# Patient Record
Sex: MALE | Race: WHITE | NOT HISPANIC OR LATINO | Employment: FULL TIME | URBAN - METROPOLITAN AREA
[De-identification: names, ages, dates, MRNs, and addresses within clinical notes are randomized per-mention and may not be internally consistent; named-entity substitution may affect disease eponyms.]

---

## 2019-07-25 ENCOUNTER — HOSPITAL ENCOUNTER (OUTPATIENT)
Dept: RADIOLOGY | Facility: HOSPITAL | Age: 59
Discharge: HOME | End: 2019-07-25
Payer: COMMERCIAL

## 2019-07-25 ENCOUNTER — TRANSCRIBE ORDERS (OUTPATIENT)
Dept: ADMINISTRATIVE | Facility: HOSPITAL | Age: 59
End: 2019-07-25

## 2019-07-25 DIAGNOSIS — R04.2 COUGHING UP BLOOD: Primary | ICD-10-CM

## 2019-07-25 DIAGNOSIS — R04.2 COUGHING UP BLOOD: ICD-10-CM

## 2019-07-25 PROCEDURE — 71046 X-RAY EXAM CHEST 2 VIEWS: CPT

## 2019-08-01 ENCOUNTER — HOSPITAL ENCOUNTER (OUTPATIENT)
Dept: RADIOLOGY | Facility: HOSPITAL | Age: 59
Discharge: HOME | End: 2019-08-01
Payer: COMMERCIAL

## 2019-08-01 ENCOUNTER — TRANSCRIBE ORDERS (OUTPATIENT)
Dept: ADMINISTRATIVE | Facility: HOSPITAL | Age: 59
End: 2019-08-01

## 2019-08-01 DIAGNOSIS — R04.2 COUGHING UP BLOOD: Primary | ICD-10-CM

## 2019-08-01 PROCEDURE — 71046 X-RAY EXAM CHEST 2 VIEWS: CPT

## 2019-09-01 ENCOUNTER — HOSPITAL ENCOUNTER (EMERGENCY)
Facility: HOSPITAL | Age: 59
Discharge: HOME | End: 2019-09-01
Attending: EMERGENCY MEDICINE

## 2019-09-01 VITALS
RESPIRATION RATE: 18 BRPM | HEART RATE: 104 BPM | DIASTOLIC BLOOD PRESSURE: 94 MMHG | OXYGEN SATURATION: 97 % | TEMPERATURE: 98.3 F | WEIGHT: 235 LBS | SYSTOLIC BLOOD PRESSURE: 181 MMHG

## 2019-09-01 DIAGNOSIS — R60.0 LOWER EXTREMITY EDEMA: Primary | ICD-10-CM

## 2019-09-01 LAB
ALBUMIN SERPL BCP-MCNC: 4 G/DL (ref 3.5–5)
ALP SERPL-CCNC: 112 U/L (ref 46–116)
ALT SERPL W P-5'-P-CCNC: 18 U/L (ref 12–78)
ANION GAP SERPL CALCULATED.3IONS-SCNC: 11 MMOL/L (ref 4–13)
APTT PPP: 29 SECONDS (ref 25–32)
AST SERPL W P-5'-P-CCNC: 16 U/L (ref 5–45)
BASOPHILS # BLD AUTO: 0.02 THOUSANDS/ΜL (ref 0–0.1)
BASOPHILS NFR BLD AUTO: 0 % (ref 0–1)
BILIRUB SERPL-MCNC: 0.8 MG/DL (ref 0.2–1)
BUN SERPL-MCNC: 12 MG/DL (ref 5–25)
CALCIUM SERPL-MCNC: 9.3 MG/DL (ref 8.3–10.1)
CHLORIDE SERPL-SCNC: 104 MMOL/L (ref 100–108)
CO2 SERPL-SCNC: 27 MMOL/L (ref 21–32)
CREAT SERPL-MCNC: 1.34 MG/DL (ref 0.6–1.3)
EOSINOPHIL # BLD AUTO: 0.02 THOUSAND/ΜL (ref 0–0.61)
EOSINOPHIL NFR BLD AUTO: 0 % (ref 0–6)
ERYTHROCYTE [DISTWIDTH] IN BLOOD BY AUTOMATED COUNT: 13.2 % (ref 11.6–15.1)
GFR SERPL CREATININE-BSD FRML MDRD: 58 ML/MIN/1.73SQ M
GLUCOSE SERPL-MCNC: 120 MG/DL (ref 65–140)
HCT VFR BLD AUTO: 46.4 % (ref 36.5–49.3)
HGB BLD-MCNC: 14.8 G/DL (ref 12–17)
IMM GRANULOCYTES # BLD AUTO: 0.03 THOUSAND/UL (ref 0–0.2)
IMM GRANULOCYTES NFR BLD AUTO: 0 % (ref 0–2)
INR PPP: 1 (ref 0.91–1.09)
LYMPHOCYTES # BLD AUTO: 1.07 THOUSANDS/ΜL (ref 0.6–4.47)
LYMPHOCYTES NFR BLD AUTO: 12 % (ref 14–44)
MCH RBC QN AUTO: 29.3 PG (ref 26.8–34.3)
MCHC RBC AUTO-ENTMCNC: 31.9 G/DL (ref 31.4–37.4)
MCV RBC AUTO: 92 FL (ref 82–98)
MONOCYTES # BLD AUTO: 0.72 THOUSAND/ΜL (ref 0.17–1.22)
MONOCYTES NFR BLD AUTO: 8 % (ref 4–12)
NEUTROPHILS # BLD AUTO: 6.98 THOUSANDS/ΜL (ref 1.85–7.62)
NEUTS SEG NFR BLD AUTO: 80 % (ref 43–75)
NRBC BLD AUTO-RTO: 0 /100 WBCS
PLATELET # BLD AUTO: 173 THOUSANDS/UL (ref 149–390)
PMV BLD AUTO: 10 FL (ref 8.9–12.7)
POTASSIUM SERPL-SCNC: 3.6 MMOL/L (ref 3.5–5.3)
PROT SERPL-MCNC: 8.1 G/DL (ref 6.4–8.2)
PROTHROMBIN TIME: 10.7 SECONDS (ref 9.8–12)
RBC # BLD AUTO: 5.05 MILLION/UL (ref 3.88–5.62)
SODIUM SERPL-SCNC: 142 MMOL/L (ref 136–145)
WBC # BLD AUTO: 8.84 THOUSAND/UL (ref 4.31–10.16)

## 2019-09-01 PROCEDURE — 96372 THER/PROPH/DIAG INJ SC/IM: CPT

## 2019-09-01 PROCEDURE — 85730 THROMBOPLASTIN TIME PARTIAL: CPT | Performed by: EMERGENCY MEDICINE

## 2019-09-01 PROCEDURE — 85025 COMPLETE CBC W/AUTO DIFF WBC: CPT | Performed by: EMERGENCY MEDICINE

## 2019-09-01 PROCEDURE — 36415 COLL VENOUS BLD VENIPUNCTURE: CPT | Performed by: EMERGENCY MEDICINE

## 2019-09-01 PROCEDURE — 80053 COMPREHEN METABOLIC PANEL: CPT | Performed by: EMERGENCY MEDICINE

## 2019-09-01 PROCEDURE — 85610 PROTHROMBIN TIME: CPT | Performed by: EMERGENCY MEDICINE

## 2019-09-01 PROCEDURE — 99284 EMERGENCY DEPT VISIT MOD MDM: CPT

## 2019-09-01 RX ADMIN — ENOXAPARIN SODIUM 105 MG: 120 INJECTION SUBCUTANEOUS at 13:11

## 2019-09-01 NOTE — ED PROVIDER NOTES
History  Chief Complaint   Patient presents with    Leg Swelling     patient c/o right lower leg pain - sent by PMD to r/o DVT  patient states pain initially began about a week ago in his heel and is now in his calf with swelling  Patient has been in his usual health recently and noted pain in his right heel and Achilles area  There was no injury  He states the pain started radiating up to the posterior calf but was improved with walking  More recently he noted the pain in the heel resolved but now he has pain in the posterior calf associated with swelling  There is no redness and no fever  There is no chest pain or shortness of breath  [facility] was [facility] today and referred to the ED for evaluation  Patient has no prior history of DVT  He has no contraindications to blood thinners          None       History reviewed  No pertinent past medical history  History reviewed  No pertinent surgical history  History reviewed  No pertinent family history  I have reviewed and agree with the history as documented  Social History     Tobacco Use    Smoking status: Never Smoker    Smokeless tobacco: Never Used   Substance Use Topics    Alcohol use: Never     Frequency: Never    Drug use: Never        Review of Systems   Constitutional: Negative for fever  HENT: Negative for congestion and sore throat  Eyes: Negative for visual disturbance  Respiratory: Negative for cough and shortness of breath  Cardiovascular: Positive for leg swelling  Negative for chest pain  Gastrointestinal: Negative for abdominal pain and vomiting  Genitourinary: Negative for dysuria and hematuria  Musculoskeletal: Positive for arthralgias and gait problem  Skin: Negative for color change and rash  Neurological: Negative for numbness and headaches  Hematological: Does not bruise/bleed easily  Psychiatric/Behavioral: Negative for confusion     All other systems reviewed and are negative  Physical Exam  Physical Exam   Constitutional: He is oriented to person, place, and time  He appears well-developed and well-nourished  HENT:   Head: Normocephalic and atraumatic  Mouth/Throat: Oropharynx is clear and moist    Eyes: Conjunctivae are normal    Neck: Normal range of motion  Neck supple  Cardiovascular: Normal rate, regular rhythm, normal heart sounds and intact distal pulses  Pulmonary/Chest: Effort normal    Abdominal: Soft  There is no tenderness  Musculoskeletal: Normal range of motion  He exhibits edema and tenderness  Neurological: He is alert and oriented to person, place, and time  Skin: Skin is warm and dry  No erythema  Psychiatric: He has a normal mood and affect  His behavior is normal    Nursing note and vitals reviewed        Vital Signs  ED Triage Vitals   Temperature Pulse Respirations Blood Pressure SpO2   09/01/19 1142 09/01/19 1137 09/01/19 1137 09/01/19 1137 09/01/19 1137   98 3 °F (36 8 °C) 104 18 (!) 181/94 97 %      Temp Source Heart Rate Source Patient Position - Orthostatic VS BP Location FiO2 (%)   09/01/19 1142 09/01/19 1137 09/01/19 1137 -- --   Tympanic Monitor Lying        Pain Score       09/01/19 1137       1           Vitals:    09/01/19 1137   BP: (!) 181/94   Pulse: 104   Patient Position - Orthostatic VS: Lying         Visual Acuity      ED Medications  Medications   enoxaparin (LOVENOX) subcutaneous injection 105 mg (has no administration in time range)       Diagnostic Studies  Results Reviewed     Procedure Component Value Units Date/Time    Comprehensive metabolic panel [[id number]]  (Abnormal) Collected:  09/01/19 1204    Lab Status:  Final result Specimen:  Blood from Arm, Left Updated:  09/01/19 1227     Sodium 142 mmol/L      Potassium 3 6 mmol/L      Chloride 104 mmol/L      CO2 27 mmol/L      ANION GAP 11 mmol/L      BUN 12 mg/dL      Creatinine 1 34 mg/dL      Glucose 120 mg/dL      Calcium 9 3 mg/dL      AST 16 U/L      ALT 18 U/L      Alkaline Phosphatase 112 U/L      Total Protein 8 1 g/dL      Albumin 4 0 g/dL      Total Bilirubin 0 80 mg/dL      eGFR 58 ml/min/1 73sq m     Narrative:       [place] guidelines for Chronic Kidney Disease (CKD):     Stage 1 with normal or high GFR (GFR > 90 mL/min/1 73 square meters)    Stage 2 Mild CKD (GFR = 60-89 mL/min/1 73 square meters)    Stage 3A Moderate CKD (GFR = 45-59 mL/min/1 73 square meters)    Stage 3B Moderate CKD (GFR = 30-44 mL/min/1 73 square meters)    Stage 4 Severe CKD (GFR = 15-29 mL/min/1 73 square meters)    Stage 5 End Stage CKD (GFR <15 mL/min/1 73 square meters)  Note: GFR calculation is accurate only with a steady state creatinine    Protime-INR [[id number]]  (Normal) Collected:  09/01/19 1204    Lab Status:  Final result Specimen:  Blood from Arm, Left Updated:  09/01/19 1224     Protime 10 7 seconds      INR 1 00    APTT [[id number]]  (Normal) Collected:  09/01/19 1204    Lab Status:  Final result Specimen:  Blood from Arm, Left Updated:  09/01/19 1224     PTT 29 seconds     CBC and differential [[id number]]  (Abnormal) Collected:  09/01/19 1204    Lab Status:  Final result Specimen:  Blood from Arm, Left Updated:  09/01/19 1210     WBC 8 84 Thousand/uL      RBC 5 05 Million/uL      Hemoglobin 14 8 g/dL      Hematocrit 46 4 %      MCV 92 fL      MCH 29 3 pg      MCHC 31 9 g/dL      RDW 13 2 %      MPV 10 0 fL      Platelets 007 Thousands/uL      nRBC 0 /100 WBCs      Neutrophils Relative 80 %      Immat GRANS % 0 %      Lymphocytes Relative 12 %      Monocytes Relative 8 %      Eosinophils Relative 0 %      Basophils Relative 0 %      Neutrophils Absolute 6 98 Thousands/µL      Immature Grans Absolute 0 03 Thousand/uL      Lymphocytes Absolute 1 07 Thousands/µL      Monocytes Absolute 0 72 Thousand/µL      Eosinophils Absolute 0 02 Thousand/µL      Basophils Absolute 0 02 Thousands/µL                  VAS lower limb venous duplex study, unilateral/limited    (Results Pending)              Procedures  Procedures       ED Course                               MDM  Number of Diagnoses or Management Options  Diagnosis management comments: Patient may have a acute DVT in the right lower extremity  Will check baseline labs including a creatinine  Will schedule outpatient vascular study and cover the patient with Lovenox until it can be arranged      Disposition  Final diagnoses:   Lower extremity edema     Time reflects when diagnosis was documented in both MDM as applicable and the Disposition within this note     Time User Action Codes Description Comment    9/1/2019  1:01 PM Paula Chow Add [R60 0] Lower extremity edema       ED Disposition     ED Disposition Condition Date/Time Comment    Discharge Stable Sun Sep 1, 2019  1:01 PM Ardell Schirmer discharge to home/self care  Follow-up Information     Follow up With Specialties Details Why Contact Info    [facility]  Schedule an appointment as soon as possible for a visit in 1 day  [phone]            Patient's Medications   Discharge Prescriptions    ENOXAPARIN (LOVENOX) 100 MG/ML    Inject 1 mL (100 mg total) under the skin every 12 (twelve) hours for 2 days       Start Date: 9/1/2019  End Date: 9/3/2019       Order Dose: 100 mg       Quantity: 4 mL    Refills: 0     No discharge procedures on file      ED Provider  Electronically Signed by           Rohan Norieag MD  09/01/19 0583

## 2019-09-03 ENCOUNTER — TRANSCRIBE ORDERS (OUTPATIENT)
Dept: ADMINISTRATIVE | Facility: HOSPITAL | Age: 59
End: 2019-09-03

## 2019-09-03 ENCOUNTER — HOSPITAL ENCOUNTER (EMERGENCY)
Facility: HOSPITAL | Age: 59
Discharge: HOME | End: 2019-09-03
Attending: EMERGENCY MEDICINE | Admitting: EMERGENCY MEDICINE

## 2019-09-03 ENCOUNTER — HOSPITAL ENCOUNTER (OUTPATIENT)
Dept: RADIOLOGY | Facility: HOSPITAL | Age: 59
Discharge: HOME | End: 2019-09-03
Attending: EMERGENCY MEDICINE

## 2019-09-03 VITALS
RESPIRATION RATE: 20 BRPM | SYSTOLIC BLOOD PRESSURE: 170 MMHG | HEART RATE: 90 BPM | TEMPERATURE: 98.6 F | DIASTOLIC BLOOD PRESSURE: 82 MMHG | OXYGEN SATURATION: 98 %

## 2019-09-03 DIAGNOSIS — M79.89 LEG SWELLING: ICD-10-CM

## 2019-09-03 DIAGNOSIS — I82.409 DVT (DEEP VENOUS THROMBOSIS) (HCC): Primary | ICD-10-CM

## 2019-09-03 DIAGNOSIS — M79.89 LEG SWELLING: Primary | ICD-10-CM

## 2019-09-03 PROCEDURE — 96372 THER/PROPH/DIAG INJ SC/IM: CPT

## 2019-09-03 PROCEDURE — 99283 EMERGENCY DEPT VISIT LOW MDM: CPT

## 2019-09-03 PROCEDURE — 93971 EXTREMITY STUDY: CPT

## 2019-09-03 PROCEDURE — 93971 EXTREMITY STUDY: CPT | Performed by: SURGERY

## 2019-09-03 RX ORDER — WARFARIN SODIUM 5 MG/1
10 TABLET ORAL
Status: DISCONTINUED | OUTPATIENT
Start: 2019-09-03 | End: 2019-09-03

## 2019-09-03 RX ORDER — WARFARIN SODIUM 5 MG/1
10 TABLET ORAL
Status: COMPLETED | OUTPATIENT
Start: 2019-09-03 | End: 2019-09-03

## 2019-09-03 RX ORDER — WARFARIN SODIUM 5 MG/1
5 TABLET ORAL
Qty: 30 TABLET | Refills: 0 | Status: SHIPPED | OUTPATIENT
Start: 2019-09-03 | End: 2019-09-10

## 2019-09-03 RX ADMIN — ENOXAPARIN SODIUM 100 MG: 100 INJECTION SUBCUTANEOUS at 15:54

## 2019-09-03 RX ADMIN — WARFARIN SODIUM 10 MG: 5 TABLET ORAL at 15:54

## 2019-09-03 NOTE — ED PROVIDER NOTES
History  Chief Complaint   Patient presents with    Evaluation of Abnormal Diagnostic Test     here a couple of days ago with leg swelling, had vascular test today , positive for dvt, on thinners     Patient was seen by me in this emergency room and treated for presumed DVT of the right lower extremity  The patient had a outpatient scheduled vascular study today which was positive for DVT at the popliteal   Patient had no chest pain shortness of breath cough or hemoptysis recently  He is sent to the  ED from vascular department          Prior to Admission Medications   Prescriptions Last Dose Informant Patient Reported? Taking?   enoxaparin (LOVENOX) 100 mg/mL 9/3/2019 at Unknown time  No Yes   Sig: Inject 1 mL (100 mg total) under the skin every 12 (twelve) hours for 2 days      Facility-Administered Medications: None       History reviewed  No pertinent past medical history  History reviewed  No pertinent surgical history  History reviewed  No pertinent family history  I have reviewed and agree with the history as documented  Social History     Tobacco Use    Smoking status: Never Smoker    Smokeless tobacco: Never Used   Substance Use Topics    Alcohol use: Never     Frequency: Never    Drug use: Never        Review of Systems   Constitutional: Negative for chills and fever  HENT: Negative for sore throat  Respiratory: Negative for shortness of breath  Cardiovascular: Positive for leg swelling  Negative for chest pain  Gastrointestinal: Negative for abdominal pain  Genitourinary: Negative for hematuria  Musculoskeletal: Positive for joint swelling  Negative for arthralgias  Skin: Negative for rash  Hematological: Does not bruise/bleed easily  All other systems reviewed and are negative  Physical Exam  Physical Exam   Constitutional: He is oriented to person, place, and time  He appears well-developed and well-nourished  HENT:   Head: Normocephalic and atraumatic  Mouth/Throat: Oropharynx is clear and moist    Eyes: Conjunctivae are normal    Neck: Normal range of motion  Neck supple  Cardiovascular: Normal rate and regular rhythm  Pulmonary/Chest: Effort normal    Abdominal: Soft  Bowel sounds are normal  There is no tenderness  Musculoskeletal: Normal range of motion  He exhibits edema  Neurological: He is alert and oriented to person, place, and time  Skin: Skin is warm and dry  Capillary refill takes less than 2 seconds  Psychiatric: He has a normal mood and affect  His behavior is normal    Nursing note and vitals reviewed  Vital Signs  ED Triage Vitals [09/03/19 1454]   Temperature Pulse Respirations Blood Pressure SpO2   98 6 °F (37 °C) 90 20 170/82 98 %      Temp Source Heart Rate Source Patient Position - Orthostatic VS BP Location FiO2 (%)   Tympanic Monitor Sitting Right arm --      Pain Score       No Pain           Vitals:    09/03/19 1454   BP: 170/82   Pulse: 90   Patient Position - Orthostatic VS: Sitting         Visual Acuity      ED Medications  Medications   enoxaparin (LOVENOX) subcutaneous injection 100 mg (has no administration in time range)   warfarin (COUMADIN) tablet 10 mg (has no administration in time range)       Diagnostic Studies  Results Reviewed     None                 No orders to display              Procedures  Procedures       ED Course                               MDM  Number of Diagnoses or Management Options  Diagnosis management comments: Patient needs a new physician in this area for follow-up  Will start him on Coumadin for insurance purposes        Disposition  Final diagnoses:   DVT (deep venous thrombosis) ([facility] [address] )     Time reflects when diagnosis was documented in both MDM as applicable and the Disposition within this note     Time User Action Codes Description Comment    9/3/2019  3:37 PM Yara Diego Add [I82 409] DVT (deep venous thrombosis) [facility])       ED Disposition     ED Disposition Condition Date/Time Comment    Discharge Stable Tue Sep 3, 2019  3:37 PM Lawrence Scanlon discharge to home/self care  Follow-up Information     Follow up With Specialties Details Why Contact Info    [facility]  Schedule an appointment as soon as possible for a visit   [facility] Schedule an appointment as soon as possible for a visit   [address]  [phone]            Patient's Medications   Discharge Prescriptions    WARFARIN (COUMADIN) 5 MG TABLET    Take 1 tablet (5 mg total) by mouth once for 1 dose       Start Date: 9/3/2019  End Date: 9/3/2019       Order Dose: 5 mg       Quantity: 30 tablet    Refills: 0     No discharge procedures on file      ED Provider  Electronically Signed by           Narcisa Schultz MD  09/03/19 6267

## 2019-09-10 ENCOUNTER — HOSPITAL ENCOUNTER (EMERGENCY)
Facility: HOSPITAL | Age: 59
Discharge: HOME | End: 2019-09-10
Attending: EMERGENCY MEDICINE

## 2019-09-10 VITALS
DIASTOLIC BLOOD PRESSURE: 81 MMHG | RESPIRATION RATE: 18 BRPM | TEMPERATURE: 97 F | OXYGEN SATURATION: 97 % | WEIGHT: 239 LBS | SYSTOLIC BLOOD PRESSURE: 155 MMHG | HEART RATE: 76 BPM

## 2019-09-10 DIAGNOSIS — S61.012A LACERATION OF LEFT THUMB WITHOUT FOREIGN BODY WITHOUT DAMAGE TO NAIL, INITIAL ENCOUNTER: Primary | ICD-10-CM

## 2019-09-10 PROCEDURE — 99282 EMERGENCY DEPT VISIT SF MDM: CPT

## 2019-09-10 PROCEDURE — 90715 TDAP VACCINE 7 YRS/> IM: CPT | Performed by: EMERGENCY MEDICINE

## 2019-09-10 PROCEDURE — 90471 IMMUNIZATION ADMIN: CPT

## 2019-09-10 RX ORDER — LIDOCAINE HYDROCHLORIDE 10 MG/ML
5 INJECTION, SOLUTION EPIDURAL; INFILTRATION; INTRACAUDAL; PERINEURAL ONCE
Status: COMPLETED | OUTPATIENT
Start: 2019-09-10 | End: 2019-09-10

## 2019-09-10 RX ADMIN — TETANUS TOXOID, REDUCED DIPHTHERIA TOXOID AND ACELLULAR PERTUSSIS VACCINE, ADSORBED 0.5 ML: 5; 2.5; 8; 8; 2.5 SUSPENSION INTRAMUSCULAR at 08:08

## 2019-09-10 RX ADMIN — LIDOCAINE HYDROCHLORIDE 5 ML: 10 INJECTION, SOLUTION EPIDURAL; INFILTRATION; INTRACAUDAL; PERINEURAL at 08:08

## 2019-09-10 NOTE — ED PROCEDURE NOTE
PROCEDURE  Laceration repair  Date/Time: 9/10/2019 8:52 AM  Performed by: Clint Cook DO  Authorized by: Clint Cook DO   Consent: Verbal consent obtained  Consent given by: patient  Body area: upper extremity  Location details: left thumb  Laceration length: 2 5 cm  Anesthesia: digital block    Anesthesia:  Local Anesthetic: lidocaine 1% without epinephrine      Procedure Details:  Preparation: Patient was prepped and draped in the usual sterile fashion    Irrigation solution: tap water  Irrigation method: tap  Amount of cleaning: standard  Skin closure: 5-0 nylon and Steri-Strips  Number of sutures: 4  Approximation: close  Approximation difficulty: simple  Patient tolerance: Patient tolerated the procedure well with no immediate complications           Clint Cook DO  09/10/19 5842

## 2019-09-10 NOTE — DISCHARGE INSTRUCTIONS
Care For Your Absorbable Stitches   WHAT YOU NEED TO KNOW:   Absorbable stitches, or sutures, are used to close cuts or wounds  These stitches are absorbed by your body, or fall off on their own within days or weeks  They do not need to be removed  DISCHARGE INSTRUCTIONS:   Seek care immediately if:   · Your wound comes apart  · You have red streaks around your wound  · You have swollen or painful lymph nodes  · Blood soaks through your bandage  Contact your healthcare provider if:   · You have signs of an infection, such as increased redness, pain, swelling, or pus  · You have a fever  · Your stitches do not absorb when your healthcare provider says they should  · You have questions or concerns about your condition or care  Care for your wound and absorbable stitches as directed:   · Protect your wound from further injury  Wear protective clothing over your wound, and protect it from sunlight  Do not place pressure on your wound  This could reopen your wound and increase your risk for an infection  · Elevate your wound  Keep your wound above the level of your heart as often as you can  This will help decrease swelling and pain  Prop your wounded area on pillows or blankets, if possible, to keep it elevated comfortably  · Wash and bandage your wound  Carefully wash the wound with soap and water  Gently dry the area and put on a new, clean bandages as directed  Change your bandages when they get wet or dirty  · Take showers instead of baths  Wait 12 to 24 hours after you receive your stitches before you take a shower  Do not take a bath or swim  Follow up with your healthcare provider as directed:  Write down your questions so you remember to ask them during your visits  © 2017 [address] [employer] Information is for End User's use only and may not be sold, redistributed or otherwise used for commercial purposes   All illustrations and images included in [facility] are the copyrighted property of [employer]  or [employer]  The above information is an  only  It is not intended as medical advice for individual conditions or treatments  Talk to your doctor, nurse or pharmacist before following any medical regimen to see if it is safe and effective for you

## 2019-09-10 NOTE — ED PROVIDER NOTES
History  Chief Complaint   Patient presents with    Laceration     L thumb laceation this am - bleeding controlled with pressure      Patient presents for evaluation of laceration to left thumb  Accidentally cut with knife  Unsure of last tetanus  History provided by:  Patient   used: No    Laceration       Prior to Admission Medications   Prescriptions Last Dose Informant Patient Reported? Taking? apixaban (ELIQUIS) 5 mg   Yes Yes   Sig: Take 20 mg by mouth 2 (two) times a day      Facility-Administered Medications: None       Past Medical History:   Diagnosis Date    DVT of lower limb, acute ([facility] [address] )        History reviewed  No pertinent surgical history  History reviewed  No pertinent family history  I have reviewed and agree with the history as documented  Social History     Tobacco Use    Smoking status: Never Smoker    Smokeless tobacco: Never Used   Substance Use Topics    Alcohol use: Never     Frequency: Never    Drug use: Never        Review of Systems   Skin: Positive for wound  All other systems reviewed and are negative  Physical Exam  Physical Exam   Constitutional: He is oriented to person, place, and time  No distress  Cardiovascular: Normal rate, regular rhythm and intact distal pulses  Pulmonary/Chest: Effort normal and breath sounds normal  No respiratory distress  Musculoskeletal: Normal range of motion  Neurological: He is alert and oriented to person, place, and time  Skin: Capillary refill takes less than 2 seconds  He is not diaphoretic  Nursing note and vitals reviewed        Vital Signs  ED Triage Vitals [09/10/19 0741]   Temperature Pulse Respirations Blood Pressure SpO2   (!) 97 °F (36 1 °C) 86 18 (!) 181/81 98 %      Temp Source Heart Rate Source Patient Position - Orthostatic VS BP Location FiO2 (%)   Tympanic Monitor Sitting Right arm --      Pain Score       1           Vitals:    09/10/19 0741   BP: (!) 181/81   Pulse: 86 Patient Position - Orthostatic VS: Sitting         Visual Acuity      ED Medications  Medications   lidocaine (PF) (XYLOCAINE-MPF) 1 % injection 5 mL (has no administration in time range)       Diagnostic Studies  Results Reviewed     None                 No orders to display              Procedures  Procedures       ED Course                               MDM  Number of Diagnoses or Management Options  Laceration of left thumb without foreign body without damage to nail, initial encounter:   Diagnosis management comments: Pulse ox 98% on RA indicating adequate oxygenation       Amount and/or Complexity of Data Reviewed  Decide to obtain previous medical records or to obtain history from someone other than the patient: yes  Review and summarize past medical records: yes    Patient Progress  Patient progress: stable      Disposition  Final diagnoses:   None     ED Disposition     None      Follow-up Information    None         Patient's Medications   Discharge Prescriptions    No medications on file     No discharge procedures on file      ED Provider  Electronically Signed by           Carl Dickson DO  09/10/19 5955

## 2019-10-04 ENCOUNTER — TRANSCRIBE ORDERS (OUTPATIENT)
Dept: ADMINISTRATIVE | Facility: HOSPITAL | Age: 59
End: 2019-10-04

## 2019-10-04 DIAGNOSIS — I82.403 ACUTE EMBOLISM AND THROMBOSIS OF UNSPECIFIED DEEP VEINS OF LOWER EXTREMITY, BILATERAL (HCC): Primary | ICD-10-CM

## 2019-10-10 ENCOUNTER — HOSPITAL ENCOUNTER (OUTPATIENT)
Dept: RADIOLOGY | Facility: HOSPITAL | Age: 59
Discharge: HOME | End: 2019-10-10
Payer: COMMERCIAL

## 2019-10-10 DIAGNOSIS — I82.403 ACUTE EMBOLISM AND THROMBOSIS OF UNSPECIFIED DEEP VEINS OF LOWER EXTREMITY, BILATERAL (HCC): ICD-10-CM

## 2019-10-10 PROCEDURE — 93970 EXTREMITY STUDY: CPT

## 2019-10-10 PROCEDURE — 93970 EXTREMITY STUDY: CPT | Performed by: SURGERY

## 2020-01-31 ENCOUNTER — TRANSCRIBE ORDERS (OUTPATIENT)
Dept: ADMINISTRATIVE | Facility: HOSPITAL | Age: 60
End: 2020-01-31

## 2020-01-31 DIAGNOSIS — I82.403: Primary | ICD-10-CM

## 2020-01-31 DIAGNOSIS — E55.9 VITAMIN D DEFICIENCY, UNSPECIFIED: ICD-10-CM

## 2020-01-31 DIAGNOSIS — R53.83 OTHER FATIGUE: ICD-10-CM

## 2020-01-31 DIAGNOSIS — D64.9 ANEMIA, UNSPECIFIED TYPE: ICD-10-CM

## 2020-01-31 DIAGNOSIS — N42.9 DISORDER OF PROSTATE, UNSPECIFIED: ICD-10-CM

## 2020-01-31 DIAGNOSIS — D51.9 ANEMIA DUE TO VITAMIN B12 DEFICIENCY, UNSPECIFIED B12 DEFICIENCY TYPE: ICD-10-CM

## 2020-02-07 ENCOUNTER — HOSPITAL ENCOUNTER (OUTPATIENT)
Dept: RADIOLOGY | Facility: HOSPITAL | Age: 60
Discharge: HOME | End: 2020-02-07
Payer: COMMERCIAL

## 2020-02-07 DIAGNOSIS — D64.9 ANEMIA, UNSPECIFIED TYPE: ICD-10-CM

## 2020-02-07 DIAGNOSIS — N42.9 DISORDER OF PROSTATE, UNSPECIFIED: ICD-10-CM

## 2020-02-07 DIAGNOSIS — I82.403: ICD-10-CM

## 2020-02-07 DIAGNOSIS — R53.83 OTHER FATIGUE: ICD-10-CM

## 2020-02-07 DIAGNOSIS — D51.9 ANEMIA DUE TO VITAMIN B12 DEFICIENCY, UNSPECIFIED B12 DEFICIENCY TYPE: ICD-10-CM

## 2020-02-07 DIAGNOSIS — E55.9 VITAMIN D DEFICIENCY, UNSPECIFIED: ICD-10-CM

## 2020-02-07 PROCEDURE — 76700 US EXAM ABDOM COMPLETE: CPT

## 2020-02-07 PROCEDURE — 93971 EXTREMITY STUDY: CPT | Performed by: SURGERY

## 2020-02-07 PROCEDURE — 93971 EXTREMITY STUDY: CPT

## 2021-11-03 ENCOUNTER — HOSPITAL ENCOUNTER (EMERGENCY)
Facility: HOSPITAL | Age: 61
Discharge: HOME | End: 2021-11-03
Attending: EMERGENCY MEDICINE
Payer: COMMERCIAL

## 2021-11-03 VITALS
OXYGEN SATURATION: 100 % | DIASTOLIC BLOOD PRESSURE: 85 MMHG | WEIGHT: 248 LBS | RESPIRATION RATE: 16 BRPM | TEMPERATURE: 97.5 F | HEART RATE: 80 BPM | SYSTOLIC BLOOD PRESSURE: 186 MMHG

## 2021-11-03 DIAGNOSIS — K42.9 UMBILICAL HERNIA WITHOUT OBSTRUCTION AND WITHOUT GANGRENE: Primary | ICD-10-CM

## 2021-11-03 PROCEDURE — 99282 EMERGENCY DEPT VISIT SF MDM: CPT | Performed by: EMERGENCY MEDICINE

## 2021-11-03 PROCEDURE — 99283 EMERGENCY DEPT VISIT LOW MDM: CPT

## 2021-11-03 RX ORDER — ASPIRIN 81 MG/1
81 TABLET, CHEWABLE ORAL DAILY
COMMUNITY

## 2021-11-15 ENCOUNTER — CONSULT (OUTPATIENT)
Dept: SURGERY | Facility: CLINIC | Age: 61
End: 2021-11-15
Payer: COMMERCIAL

## 2021-11-15 VITALS
BODY MASS INDEX: 33.83 KG/M2 | DIASTOLIC BLOOD PRESSURE: 86 MMHG | SYSTOLIC BLOOD PRESSURE: 172 MMHG | TEMPERATURE: 98.4 F | WEIGHT: 249.8 LBS | HEART RATE: 87 BPM | HEIGHT: 72 IN

## 2021-11-15 DIAGNOSIS — E66.9 OBESITY (BMI 30-39.9): ICD-10-CM

## 2021-11-15 DIAGNOSIS — K42.9 UMBILICAL HERNIA WITHOUT OBSTRUCTION AND WITHOUT GANGRENE: Primary | ICD-10-CM

## 2021-11-15 DIAGNOSIS — Z12.11 COLON CANCER SCREENING: ICD-10-CM

## 2021-11-15 PROCEDURE — 99203 OFFICE O/P NEW LOW 30 MIN: CPT | Performed by: SURGERY

## 2022-01-28 ENCOUNTER — HOSPITAL ENCOUNTER (EMERGENCY)
Facility: HOSPITAL | Age: 62
Discharge: HOME | End: 2022-01-28
Attending: EMERGENCY MEDICINE
Payer: COMMERCIAL

## 2022-01-28 VITALS
OXYGEN SATURATION: 98 % | DIASTOLIC BLOOD PRESSURE: 81 MMHG | RESPIRATION RATE: 16 BRPM | TEMPERATURE: 98.7 F | HEART RATE: 103 BPM | SYSTOLIC BLOOD PRESSURE: 189 MMHG

## 2022-01-28 DIAGNOSIS — L50.9 URTICARIA: Primary | ICD-10-CM

## 2022-01-28 PROCEDURE — 99282 EMERGENCY DEPT VISIT SF MDM: CPT

## 2022-01-28 PROCEDURE — 99284 EMERGENCY DEPT VISIT MOD MDM: CPT | Performed by: EMERGENCY MEDICINE

## 2022-01-28 RX ORDER — HYDROXYZINE HYDROCHLORIDE 25 MG/1
50 TABLET, FILM COATED ORAL EVERY 6 HOURS PRN
Qty: 12 TABLET | Refills: 0 | Status: SHIPPED | OUTPATIENT
Start: 2022-01-28 | End: 2022-01-28 | Stop reason: SDUPTHER

## 2022-01-28 RX ORDER — PREDNISONE 10 MG/1
TABLET ORAL
Qty: 30 TABLET | Refills: 0 | Status: SHIPPED | OUTPATIENT
Start: 2022-01-28

## 2022-01-28 RX ORDER — HYDROXYZINE HYDROCHLORIDE 25 MG/1
50 TABLET, FILM COATED ORAL EVERY 6 HOURS PRN
Qty: 30 TABLET | Refills: 0 | Status: SHIPPED | OUTPATIENT
Start: 2022-01-28

## 2022-01-28 RX ORDER — PREDNISONE 20 MG/1
60 TABLET ORAL ONCE
Status: COMPLETED | OUTPATIENT
Start: 2022-01-28 | End: 2022-01-28

## 2022-01-28 RX ORDER — HYDROXYZINE HYDROCHLORIDE 25 MG/1
50 TABLET, FILM COATED ORAL ONCE
Status: COMPLETED | OUTPATIENT
Start: 2022-01-28 | End: 2022-01-28

## 2022-01-28 RX ADMIN — HYDROXYZINE HYDROCHLORIDE 50 MG: 25 TABLET ORAL at 16:57

## 2022-01-28 RX ADMIN — PREDNISONE 60 MG: 20 TABLET ORAL at 16:57

## 2022-01-28 NOTE — ED PROVIDER NOTES
History  Chief Complaint   Patient presents with    Rash     rash to torso and neck for a couple of days  states itchy     63 yo male s/p covid infection 2 weeks ago c/o rash on trunk for the last 3 days  Rash is itchy  He had been taking 4 different new meds for covid, he thinks they were vitamins and supplements  No recent fever, cough, vomiting, diarrhea  No new products or foods  He has a h/o environmental allergies as a child  He did try a couple doses of benadryl yesterday but it didn't help  History provided by:  Patient   used: No        Prior to Admission Medications   Prescriptions Last Dose Informant Patient Reported? Taking? apixaban (ELIQUIS) 5 mg   Yes No   Sig: Take 20 mg by mouth 2 (two) times a day   Patient not taking: Reported on 11/3/2021   aspirin 81 mg chewable tablet 1/27/2022 at Unknown time  Yes Yes   Sig: Chew 81 mg daily      Facility-Administered Medications: None       Past Medical History:   Diagnosis Date    DVT of lower limb, acute (HCC)     Pneumonia     Rheumatic fever        Past Surgical History:   Procedure Laterality Date    TONSILLECTOMY         History reviewed  No pertinent family history  I have reviewed and agree with the history as documented  E-Cigarette/Vaping    E-Cigarette Use Never User      E-Cigarette/Vaping Substances     Social History     Tobacco Use    Smoking status: Never Smoker    Smokeless tobacco: Never Used   Vaping Use    Vaping Use: Never used   Substance Use Topics    Alcohol use: Never    Drug use: Never       Review of Systems   Constitutional: Negative  Negative for chills and fever  HENT: Negative  Negative for congestion and sore throat  Eyes: Negative  Respiratory: Negative  Negative for cough and shortness of breath  Cardiovascular: Negative  Negative for chest pain and leg swelling  Gastrointestinal: Negative  Negative for abdominal pain, diarrhea, nausea and vomiting  Genitourinary: Negative  Negative for dysuria, flank pain and hematuria  Musculoskeletal: Negative  Negative for back pain and myalgias  Skin: Positive for rash  Negative for wound  Neurological: Negative  Negative for dizziness and headaches  Psychiatric/Behavioral: Negative  Negative for confusion and hallucinations  The patient is not nervous/anxious  All other systems reviewed and are negative  Physical Exam  Physical Exam  Vitals and nursing note reviewed  Constitutional:       General: He is not in acute distress  Appearance: He is well-developed  He is not ill-appearing or diaphoretic  HENT:      Head: Normocephalic and atraumatic  Right Ear: External ear normal       Left Ear: External ear normal    Eyes:      General: No scleral icterus  Conjunctiva/sclera: Conjunctivae normal    Cardiovascular:      Rate and Rhythm: Normal rate and regular rhythm  Heart sounds: Normal heart sounds  No murmur heard  Pulmonary:      Effort: Pulmonary effort is normal  No respiratory distress  Breath sounds: Normal breath sounds  Chest:      Chest wall: No tenderness  Musculoskeletal:         General: No tenderness or deformity  Normal range of motion  Cervical back: Normal range of motion and neck supple  Skin:     General: Skin is warm and dry  Coloration: Skin is not pale  Findings: Rash present  No erythema  Comments: Diffuse urticaria all over trunk front and back   Neurological:      General: No focal deficit present  Mental Status: He is alert and oriented to person, place, and time  Motor: No weakness     Psychiatric:         Mood and Affect: Mood normal          Behavior: Behavior normal          Vital Signs  ED Triage Vitals [01/28/22 1632]   Temperature Pulse Respirations Blood Pressure SpO2   98 7 °F (37 1 °C) 103 16 (!) 189/81 98 %      Temp Source Heart Rate Source Patient Position - Orthostatic VS BP Location FiO2 (%) Tympanic Monitor Sitting Right arm --      Pain Score       No Pain           Vitals:    01/28/22 1632   BP: (!) 189/81   Pulse: 103   Patient Position - Orthostatic VS: Sitting         Visual Acuity      ED Medications  Medications   predniSONE tablet 60 mg (60 mg Oral Given 1/28/22 1657)   hydrOXYzine HCL (ATARAX) tablet 50 mg (50 mg Oral Given 1/28/22 1657)       Diagnostic Studies  Results Reviewed     None                 No orders to display              Procedures  Procedures         ED Course                                             MDM  Number of Diagnoses or Management Options  Urticaria  Diagnosis management comments: Will try course of prednisone taper and atarax and advised follow up outpt  If not improving  Disposition  Final diagnoses:   Urticaria     Time reflects when diagnosis was documented in both MDM as applicable and the Disposition within this note     Time User Action Codes Description Comment    4/98/9754  9:90 PM Sahara Fagan [F90 6] Urticaria       ED Disposition     ED Disposition Condition Date/Time Comment    Discharge Stable Fri Jan 28, 2022  4:50 PM Merna Bumps discharge to home/self care  Follow-up Information     Follow up With Specialties Details Why Contact Info    your doctor   As needed           Patient's Medications   Discharge Prescriptions    HYDROXYZINE HCL (ATARAX) 25 MG TABLET    Take 2 tablets (50 mg total) by mouth every 6 (six) hours as needed for itching       Start Date: 1/28/2022 End Date: --       Order Dose: 50 mg       Quantity: 30 tablet    Refills: 0    PREDNISONE 10 MG TABLET    5 po daily x2 days, then 4 po daily x2 days, then 3 po daily x2 days, then 2 po daily x2 days,then 1 po daily x2days       Start Date: 1/28/2022 End Date: --       Order Dose: --       Quantity: 30 tablet    Refills: 0       No discharge procedures on file      PDMP Review     None          ED Provider  Electronically Signed by           nAdrew Diaz MD  01/28/22 9428

## 2022-05-01 ENCOUNTER — HOSPITAL ENCOUNTER (EMERGENCY)
Facility: HOSPITAL | Age: 62
Discharge: HOME | End: 2022-05-02
Attending: EMERGENCY MEDICINE
Payer: COMMERCIAL

## 2022-05-01 ENCOUNTER — APPOINTMENT (EMERGENCY)
Dept: RADIOLOGY | Facility: HOSPITAL | Age: 62
End: 2022-05-01
Payer: COMMERCIAL

## 2022-05-01 DIAGNOSIS — N20.0 KIDNEY STONE: Primary | ICD-10-CM

## 2022-05-01 LAB
ALBUMIN SERPL BCP-MCNC: 4.2 G/DL (ref 3.5–5)
ALP SERPL-CCNC: 105 U/L (ref 46–116)
ALT SERPL W P-5'-P-CCNC: 37 U/L (ref 12–78)
ANION GAP SERPL CALCULATED.3IONS-SCNC: 12 MMOL/L (ref 4–13)
AST SERPL W P-5'-P-CCNC: 30 U/L (ref 5–45)
BACTERIA UR QL AUTO: ABNORMAL /HPF
BASOPHILS # BLD AUTO: 0.05 THOUSANDS/ΜL (ref 0–0.1)
BASOPHILS NFR BLD AUTO: 0 % (ref 0–1)
BILIRUB SERPL-MCNC: 0.76 MG/DL (ref 0.2–1)
BILIRUB UR QL STRIP: NEGATIVE
BUN SERPL-MCNC: 20 MG/DL (ref 5–25)
CALCIUM SERPL-MCNC: 9.1 MG/DL (ref 8.3–10.1)
CHLORIDE SERPL-SCNC: 103 MMOL/L (ref 100–108)
CLARITY UR: CLEAR
CO2 SERPL-SCNC: 25 MMOL/L (ref 21–32)
COLOR UR: YELLOW
CREAT SERPL-MCNC: 1.62 MG/DL (ref 0.6–1.3)
EOSINOPHIL # BLD AUTO: 0 THOUSAND/ΜL (ref 0–0.61)
EOSINOPHIL NFR BLD AUTO: 0 % (ref 0–6)
ERYTHROCYTE [DISTWIDTH] IN BLOOD BY AUTOMATED COUNT: 13 % (ref 11.6–15.1)
GFR SERPL CREATININE-BSD FRML MDRD: 44 ML/MIN/1.73SQ M
GLUCOSE SERPL-MCNC: 127 MG/DL (ref 65–140)
GLUCOSE UR STRIP-MCNC: NEGATIVE MG/DL
HCT VFR BLD AUTO: 46.4 % (ref 36.5–49.3)
HGB BLD-MCNC: 15.7 G/DL (ref 12–17)
HGB UR QL STRIP.AUTO: ABNORMAL
IMM GRANULOCYTES # BLD AUTO: 0.07 THOUSAND/UL (ref 0–0.2)
IMM GRANULOCYTES NFR BLD AUTO: 0 % (ref 0–2)
KETONES UR STRIP-MCNC: ABNORMAL MG/DL
LEUKOCYTE ESTERASE UR QL STRIP: NEGATIVE
LYMPHOCYTES # BLD AUTO: 0.77 THOUSANDS/ΜL (ref 0.6–4.47)
LYMPHOCYTES NFR BLD AUTO: 4 % (ref 14–44)
MCH RBC QN AUTO: 30.7 PG (ref 26.8–34.3)
MCHC RBC AUTO-ENTMCNC: 33.8 G/DL (ref 31.4–37.4)
MCV RBC AUTO: 91 FL (ref 82–98)
MONOCYTES # BLD AUTO: 1.19 THOUSAND/ΜL (ref 0.17–1.22)
MONOCYTES NFR BLD AUTO: 7 % (ref 4–12)
MUCOUS THREADS UR QL AUTO: ABNORMAL
NEUTROPHILS # BLD AUTO: 15.41 THOUSANDS/ΜL (ref 1.85–7.62)
NEUTS SEG NFR BLD AUTO: 89 % (ref 43–75)
NITRITE UR QL STRIP: NEGATIVE
NON-SQ EPI CELLS URNS QL MICRO: ABNORMAL /HPF
NRBC BLD AUTO-RTO: 0 /100 WBCS
PH UR STRIP.AUTO: 5.5 [PH]
PLATELET # BLD AUTO: 284 THOUSANDS/UL (ref 149–390)
PMV BLD AUTO: 10.2 FL (ref 8.9–12.7)
POTASSIUM SERPL-SCNC: 4.4 MMOL/L (ref 3.5–5.3)
PROT SERPL-MCNC: 8.1 G/DL (ref 6.4–8.2)
PROT UR STRIP-MCNC: NEGATIVE MG/DL
RBC # BLD AUTO: 5.12 MILLION/UL (ref 3.88–5.62)
RBC #/AREA URNS AUTO: ABNORMAL /HPF
SODIUM SERPL-SCNC: 140 MMOL/L (ref 136–145)
SP GR UR STRIP.AUTO: >=1.03 (ref 1–1.03)
UROBILINOGEN UR QL STRIP.AUTO: 0.2 E.U./DL
WBC # BLD AUTO: 17.49 THOUSAND/UL (ref 4.31–10.16)
WBC #/AREA URNS AUTO: ABNORMAL /HPF

## 2022-05-01 PROCEDURE — G1004 CDSM NDSC: HCPCS

## 2022-05-01 PROCEDURE — 74176 CT ABD & PELVIS W/O CONTRAST: CPT

## 2022-05-01 PROCEDURE — 80053 COMPREHEN METABOLIC PANEL: CPT

## 2022-05-01 PROCEDURE — 81001 URINALYSIS AUTO W/SCOPE: CPT

## 2022-05-01 PROCEDURE — 99285 EMERGENCY DEPT VISIT HI MDM: CPT | Performed by: EMERGENCY MEDICINE

## 2022-05-01 PROCEDURE — 36415 COLL VENOUS BLD VENIPUNCTURE: CPT

## 2022-05-01 PROCEDURE — 99284 EMERGENCY DEPT VISIT MOD MDM: CPT

## 2022-05-01 PROCEDURE — 96374 THER/PROPH/DIAG INJ IV PUSH: CPT

## 2022-05-01 PROCEDURE — 85025 COMPLETE CBC W/AUTO DIFF WBC: CPT

## 2022-05-01 PROCEDURE — 96361 HYDRATE IV INFUSION ADD-ON: CPT

## 2022-05-01 RX ORDER — HYDROMORPHONE HCL/PF 1 MG/ML
0.5 SYRINGE (ML) INJECTION ONCE
Status: COMPLETED | OUTPATIENT
Start: 2022-05-01 | End: 2022-05-01

## 2022-05-01 RX ORDER — TAMSULOSIN HYDROCHLORIDE 0.4 MG/1
0.4 CAPSULE ORAL ONCE
Status: COMPLETED | OUTPATIENT
Start: 2022-05-01 | End: 2022-05-02

## 2022-05-01 RX ADMIN — HYDROMORPHONE HYDROCHLORIDE 0.5 MG: 1 INJECTION, SOLUTION INTRAMUSCULAR; INTRAVENOUS; SUBCUTANEOUS at 22:46

## 2022-05-01 RX ADMIN — SODIUM CHLORIDE 1000 ML: 0.9 INJECTION, SOLUTION INTRAVENOUS at 22:03

## 2022-05-01 NOTE — Clinical Note
Yesenia Nguyen was seen and treated in our emergency department on 5/1/2022  Diagnosis:     Marbella Chris  may return to work on return date  He may return on this date: 05/03/2022         If you have any questions or concerns, please don't hesitate to call        Sherley Kat MD    ______________________________           _______________          _______________  Hospital Representative                              Date                                Time

## 2022-05-02 VITALS
HEART RATE: 82 BPM | TEMPERATURE: 97.1 F | OXYGEN SATURATION: 99 % | SYSTOLIC BLOOD PRESSURE: 154 MMHG | DIASTOLIC BLOOD PRESSURE: 72 MMHG | RESPIRATION RATE: 18 BRPM

## 2022-05-02 RX ORDER — OXYCODONE HYDROCHLORIDE AND ACETAMINOPHEN 5; 325 MG/1; MG/1
1 TABLET ORAL EVERY 4 HOURS PRN
Qty: 20 TABLET | Refills: 0 | Status: SHIPPED | OUTPATIENT
Start: 2022-05-02 | End: 2022-05-09

## 2022-05-02 RX ORDER — OXYCODONE HYDROCHLORIDE AND ACETAMINOPHEN 5; 325 MG/1; MG/1
2 TABLET ORAL ONCE
Status: COMPLETED | OUTPATIENT
Start: 2022-05-02 | End: 2022-05-02

## 2022-05-02 RX ORDER — TAMSULOSIN HYDROCHLORIDE 0.4 MG/1
0.4 CAPSULE ORAL
Qty: 5 CAPSULE | Refills: 0 | Status: SHIPPED | OUTPATIENT
Start: 2022-05-02

## 2022-05-02 RX ADMIN — TAMSULOSIN HYDROCHLORIDE 0.4 MG: 0.4 CAPSULE ORAL at 00:18

## 2022-05-02 RX ADMIN — OXYCODONE HYDROCHLORIDE AND ACETAMINOPHEN 2 TABLET: 5; 325 TABLET ORAL at 00:59

## 2022-05-02 NOTE — ED NOTES
Patient discharged with urinal and urine strainer  Given information about trying to collect stone  Patient verbalizes understanding        Jesika Velasquez, [facility]  05/02/22 5949

## 2022-05-02 NOTE — ED NOTES
[facility]      Veleta Roots, [place]  05/01/22 8717

## 2022-05-02 NOTE — ED PROVIDER NOTES
History  Chief Complaint   Patient presents with    Flank Pain     L flank pain with some radiation to side which started this afternoon  may have seen some blood in his urine today     Patient states he had some mild discomfort in the left flank yesterday  Today the pain became much worse and was associated with an episode of  hematuria without dysuria or frequency  He had no nausea vomiting  No fever chills  Patient takes aspirin but no other blood thinners  He has a family history of kidney stones but he himself has never had 1  Prior to Admission Medications   Prescriptions Last Dose Informant Patient Reported? Taking? apixaban (ELIQUIS) 5 mg   Yes No   Sig: Take 20 mg by mouth 2 (two) times a day   Patient not taking: Reported on 11/3/2021   aspirin 81 mg chewable tablet   Yes No   Sig: Chew 81 mg daily   hydrOXYzine HCL (ATARAX) 25 mg tablet Not Taking at Unknown time  No No   Sig: Take 2 tablets (50 mg total) by mouth every 6 (six) hours as needed for itching   Patient not taking: Reported on 2022    predniSONE 10 mg tablet Not Taking at Unknown time  No No   Si po daily x2 days, then 4 po daily x2 days, then 3 po daily x2 days, then 2 po daily x2 days,then 1 po daily x2days   Patient not taking: Reported on 2022       Facility-Administered Medications: None       Past Medical History:   Diagnosis Date    DVT of lower limb, acute (HCC)     Pneumonia     Rheumatic fever        Past Surgical History:   Procedure Laterality Date    TONSILLECTOMY         History reviewed  No pertinent family history  I have reviewed and agree with the history as documented      E-Cigarette/Vaping    E-Cigarette Use Never User      E-Cigarette/Vaping Substances     Social History     Tobacco Use    Smoking status: Never Smoker    Smokeless tobacco: Never Used   Vaping Use    Vaping Use: Never used   Substance Use Topics    Alcohol use: Never    Drug use: Never       Review of Systems Constitutional: Negative for chills, diaphoresis and fever  HENT: Negative for congestion and sore throat  Eyes: Negative for visual disturbance  Respiratory: Negative for shortness of breath  Cardiovascular: Negative for chest pain  Gastrointestinal: Positive for abdominal pain  Negative for nausea and vomiting  Genitourinary: Positive for flank pain and hematuria  Negative for dysuria, frequency, genital sores and penile pain  Musculoskeletal: Positive for back pain  Skin: Negative for color change and rash  Neurological: Negative for headaches  Hematological: Does not bruise/bleed easily  Psychiatric/Behavioral: Negative for confusion  All other systems reviewed and are negative  Physical Exam  Physical Exam  Vitals and nursing note reviewed  Constitutional:       Appearance: Normal appearance  HENT:      Head: Normocephalic  Right Ear: External ear normal       Left Ear: External ear normal       Nose: Nose normal       Mouth/Throat:      Pharynx: Oropharynx is clear  Eyes:      Conjunctiva/sclera: Conjunctivae normal    Cardiovascular:      Rate and Rhythm: Normal rate and regular rhythm  Pulses: Normal pulses  Heart sounds: Normal heart sounds  Pulmonary:      Effort: Pulmonary effort is normal    Abdominal:      General: Bowel sounds are normal       Palpations: Abdomen is soft  Tenderness: There is no abdominal tenderness  Musculoskeletal:         General: No tenderness  Normal range of motion  Cervical back: Normal range of motion and neck supple  Comments: No CVA tenderness   Skin:     General: Skin is warm and dry  Capillary Refill: Capillary refill takes less than 2 seconds  Neurological:      General: No focal deficit present  Mental Status: He is alert and oriented to person, place, and time     Psychiatric:         Mood and Affect: Mood normal          Behavior: Behavior normal          Vital Signs  ED Triage Vitals [05/01/22 2006]   Temperature Pulse Respirations Blood Pressure SpO2   (!) 97 1 °F (36 2 °C) 85 (!) 24 (!) 188/88 100 %      Temp Source Heart Rate Source Patient Position - Orthostatic VS BP Location FiO2 (%)   Tympanic Monitor Sitting Right arm --      Pain Score       5           Vitals:    05/01/22 2006 05/01/22 2220 05/01/22 2245   BP: (!) 188/88 158/73    Pulse: 85 84 80   Patient Position - Orthostatic VS: Sitting Sitting          Visual Acuity      ED Medications  Medications   oxyCODONE-acetaminophen (PERCOCET) 5-325 mg per tablet 2 tablet (has no administration in time range)   sodium chloride 0 9 % bolus 1,000 mL (0 mL Intravenous Stopped 5/2/22 0017)   HYDROmorphone (DILAUDID) injection 0 5 mg (0 5 mg Intravenous Given 5/1/22 2246)   tamsulosin (FLOMAX) capsule 0 4 mg (0 4 mg Oral Given 5/2/22 0018)       Diagnostic Studies  Results Reviewed     Procedure Component Value Units Date/Time    Urine Microscopic [[id number]]  (Abnormal) Collected: 05/01/22 2203    Lab Status: Final result Specimen: Urine, Clean Catch Updated: 05/01/22 2222     RBC, UA 30-50 /hpf      WBC, UA 0-1 /hpf      Epithelial Cells Occasional /hpf      Bacteria, UA Occasional /hpf      MUCUS THREADS Occasional    Comprehensive metabolic panel [[id number]]  (Abnormal) Collected: 05/01/22 2156    Lab Status: Final result Specimen: Blood from Arm, Left Updated: 05/01/22 2217     Sodium 140 mmol/L      Potassium 4 4 mmol/L      Chloride 103 mmol/L      CO2 25 mmol/L      ANION GAP 12 mmol/L      BUN 20 mg/dL      Creatinine 1 62 mg/dL      Glucose 127 mg/dL      Calcium 9 1 mg/dL      AST 30 U/L      ALT 37 U/L      Alkaline Phosphatase 105 U/L      Total Protein 8 1 g/dL      Albumin 4 2 g/dL      Total Bilirubin 0 76 mg/dL      eGFR 44 ml/min/1 73sq m     Narrative:      Sammi guidelines for Chronic Kidney Disease (CKD):     Stage 1 with normal or high GFR (GFR > 90 mL/min/1 73 square meters)    Stage 2 Mild CKD (GFR = 60-89 mL/min/1 73 square meters)    Stage 3A Moderate CKD (GFR = 45-59 mL/min/1 73 square meters)    Stage 3B Moderate CKD (GFR = 30-44 mL/min/1 73 square meters)    Stage 4 Severe CKD (GFR = 15-29 mL/min/1 73 square meters)    Stage 5 End Stage CKD (GFR <15 mL/min/1 73 square meters)  Note: GFR calculation is accurate only with a steady state creatinine    UA (URINE) with reflex to Scope [[id number]]  (Abnormal) Collected: 05/01/22 2203    Lab Status: Final result Specimen: Urine, Clean Catch Updated: 05/01/22 2212     Color, UA Yellow     Clarity, UA Clear     Specific Gravity, UA >=1 030     pH, UA 5 5     Leukocytes, UA Negative     Nitrite, UA Negative     Protein, UA Negative mg/dl      Glucose, UA Negative mg/dl      Ketones, UA 15 (1+) mg/dl      Urobilinogen, UA 0 2 E U /dl      Bilirubin, UA Negative     Blood, UA Large    CBC and differential [[id number]]  (Abnormal) Collected: 05/01/22 2156    Lab Status: Final result Specimen: Blood from Arm, Left Updated: 05/01/22 2202     WBC 17 49 Thousand/uL      RBC 5 12 Million/uL      Hemoglobin 15 7 g/dL      Hematocrit 46 4 %      MCV 91 fL      MCH 30 7 pg      MCHC 33 8 g/dL      RDW 13 0 %      MPV 10 2 fL      Platelets 280 Thousands/uL      nRBC 0 /100 WBCs      Neutrophils Relative 89 %      Immat GRANS % 0 %      Lymphocytes Relative 4 %      Monocytes Relative 7 %      Eosinophils Relative 0 %      Basophils Relative 0 %      Neutrophils Absolute 15 41 Thousands/µL      Immature Grans Absolute 0 07 Thousand/uL      Lymphocytes Absolute 0 77 Thousands/µL      Monocytes Absolute 1 19 Thousand/µL      Eosinophils Absolute 0 00 Thousand/µL      Basophils Absolute 0 05 Thousands/µL                  CT renal stone study abdomen pelvis without contrast   Final Result by Leeanna García DO (05/02 0459)      Mild left-sided hydroureteronephrosis with a 2 mm obstructing stone in the distal left ureter        Thickening of the urinary bladder wall which may represent cystitis  Follow-up with urology is recommended  Nonobstructing bilateral punctate intrarenal calculi  Workstation performed: [id number]                    Procedures  Procedures         ED Course                               SBIRT 22yo+      Most Recent Value   SBIRT (25 yo +)    In order to provide better care to our patients, we are screening all of our patients for alcohol and drug use  Would it be okay to ask you these screening questions? No Filed at: 05/01/2022 2221                    [facility]  Number of Diagnoses or Management Options  Diagnosis management comments: Flank pain associated with hematuria in a patient with a family history of kidney stones  Will CT scan for possible stone      Disposition  Final diagnoses:   Kidney stone     Time reflects when diagnosis was documented in both MDM as applicable and the Disposition within this note     Time User Action Codes Description Comment    5/2/2022 12:48 AM Shari Hansen Add [N20 0] Kidney stone       ED Disposition     ED Disposition Condition Date/Time Comment    Discharge Stable Mon May 2, 2022 12:48 AM Jayce Vega discharge to home/self care              Follow-up Information     Follow up With Specialties Details Why Contact Info    Paramjit Silva MD Urology Schedule an appointment as soon as possible for a visit in 1 day  [address] [place] [address]  [phone]            Patient's Medications   Discharge Prescriptions    OXYCODONE-ACETAMINOPHEN (PERCOCET) 5-325 MG PER TABLET    Take 1 tablet by mouth every 4 (four) hours as needed for severe pain for up to 7 days Max Daily Amount: 6 tablets       Start Date: 5/2/2022  End Date: 5/9/2022       Order Dose: 1 tablet       Quantity: 20 tablet    Refills: 0    TAMSULOSIN (FLOMAX) 0 4 MG    Take 1 capsule (0 4 mg total) by mouth daily with dinner       Start Date: 5/2/2022  End Date: --       Order Dose: 0 4 mg       Quantity: 5 capsule    Refills: 0 No discharge procedures on file      PDMP Review     None          ED Provider  Electronically Signed by           Rivas Moreno MD  05/02/22 2588

## 2022-10-12 PROBLEM — Z12.11 COLON CANCER SCREENING: Status: RESOLVED | Noted: 2021-11-15 | Resolved: 2022-10-12

## 2023-07-10 ENCOUNTER — HOSPITAL ENCOUNTER (EMERGENCY)
Facility: HOSPITAL | Age: 63
Discharge: HOME | End: 2023-07-11
Attending: EMERGENCY MEDICINE
Payer: COMMERCIAL

## 2023-07-10 DIAGNOSIS — H02.842 SWELLING OF RIGHT LOWER EYELID: ICD-10-CM

## 2023-07-10 DIAGNOSIS — H02.845 SWELLING OF LEFT LOWER EYELID: Primary | ICD-10-CM

## 2023-07-10 PROCEDURE — 99282 EMERGENCY DEPT VISIT SF MDM: CPT

## 2023-07-10 PROCEDURE — 99284 EMERGENCY DEPT VISIT MOD MDM: CPT | Performed by: EMERGENCY MEDICINE

## 2023-07-11 VITALS
SYSTOLIC BLOOD PRESSURE: 195 MMHG | RESPIRATION RATE: 18 BRPM | DIASTOLIC BLOOD PRESSURE: 89 MMHG | WEIGHT: 250 LBS | BODY MASS INDEX: 33.91 KG/M2 | TEMPERATURE: 97.8 F | HEART RATE: 73 BPM | OXYGEN SATURATION: 99 %

## 2023-07-11 RX ORDER — LORATADINE 10 MG/1
10 TABLET ORAL DAILY
Qty: 20 TABLET | Refills: 0 | Status: SHIPPED | OUTPATIENT
Start: 2023-07-10

## 2023-07-11 NOTE — DISCHARGE INSTRUCTIONS
I think you likely have swelling of the lower eyelid secondary to some kind of allergic reaction to the weeds that you came in contact to. Please take 1 tablet of the claritin that I prescribed to you daily to see if there is any improvement. Otherwise please follow up with Opthamology at their office below. Return to the ER for fevers or chills.

## 2023-07-11 NOTE — ED PROVIDER NOTES
History  Chief Complaint   Patient presents with   • Facial Swelling     Pt c/o b/l eye swelling x 2 weeks or more. Pt informed RN was seen at urgent care prior and given steroids. Pt completed steroid pack on  and still c/o slight swelling under b/l eyes. 24-year-old male presenting to the ER today with bilateral lower lid swelling. Patient states that his eyes have been swollen for the last 2 weeks. He says that initially went to urgent care and he was given a steroid pack which she completed on  but was still having the swelling under the bilateral eyes and so is why he came in. He denies any fevers or chills. No pain with extraocular motions. States that from time to time his eyes do get itchy. He has not seen a primary care doctor or eye doctor for this. No other symptoms at this time. No nausea vomiting chest pain or shortness of breath. Patient did tell me that this initially started when he was outside pulling some weeds and then he rubbed his eyes and so he is worried that there might be some sort of poison or something that got onto his eyes. Prior to Admission Medications   Prescriptions Last Dose Informant Patient Reported? Taking?    apixaban (ELIQUIS) 5 mg   Yes No   Sig: Take 20 mg by mouth 2 (two) times a day   Patient not taking: Reported on 11/3/2021   aspirin 81 mg chewable tablet   Yes No   Sig: Chew 81 mg daily   hydrOXYzine HCL (ATARAX) 25 mg tablet   No No   Sig: Take 2 tablets (50 mg total) by mouth every 6 (six) hours as needed for itching   Patient not taking: Reported on 2022    predniSONE 10 mg tablet   No No   Si po daily x2 days, then 4 po daily x2 days, then 3 po daily x2 days, then 2 po daily x2 days,then 1 po daily x2days   Patient not taking: Reported on 2022    tamsulosin (FLOMAX) 0.4 mg   No No   Sig: Take 1 capsule (0.4 mg total) by mouth daily with dinner      Facility-Administered Medications: None       Past Medical History: Diagnosis Date   • DVT of lower limb, acute ([address])    • Pneumonia    • Rheumatic fever        Past Surgical History:   Procedure Laterality Date   • TONSILLECTOMY         History reviewed. No pertinent family history. I have reviewed and agree with the history as documented. E-Cigarette/Vaping   • E-Cigarette Use Never User      E-Cigarette/Vaping Substances   • Nicotine No    • THC No    • CBD No    • Flavoring No    • Other No    • Unknown No      Social History     Tobacco Use   • Smoking status: Never   • Smokeless tobacco: Never   Vaping Use   • Vaping Use: Never used   Substance Use Topics   • Alcohol use: Never   • Drug use: Never       Review of Systems   Constitutional: Negative for chills and fever. HENT: Negative for hearing loss. Eyes: Negative for photophobia, redness and visual disturbance. Respiratory: Negative for shortness of breath. Cardiovascular: Negative for chest pain. Gastrointestinal: Negative for abdominal pain, constipation, diarrhea, nausea and vomiting. Genitourinary: Negative for difficulty urinating. Musculoskeletal: Negative for myalgias. Skin: Negative for rash. Neurological: Negative for dizziness. Psychiatric/Behavioral: Negative for agitation. All other systems reviewed and are negative. Physical Exam  Physical Exam  Vitals and nursing note reviewed. Constitutional:       General: He is not in acute distress. Appearance: Normal appearance. He is not ill-appearing. HENT:      Head: Normocephalic and atraumatic. Right Ear: External ear normal.      Left Ear: External ear normal.      Nose: Nose normal. No congestion. Mouth/Throat:      Mouth: Mucous membranes are moist.      Pharynx: Oropharynx is clear. No oropharyngeal exudate. Eyes:      General:         Right eye: No discharge. Left eye: No discharge. Extraocular Movements: Extraocular movements intact.       Conjunctiva/sclera: Conjunctivae normal.      Pupils: Pupils are equal, round, and reactive to light. Comments: Patient has swelling of the lower eyelids but otherwise conjunctive are normal and extraocular motions without any pain. Cardiovascular:      Rate and Rhythm: Normal rate and regular rhythm. Pulses: Normal pulses. Heart sounds: Normal heart sounds. No murmur heard. Pulmonary:      Effort: Pulmonary effort is normal. No respiratory distress. Breath sounds: Normal breath sounds. No wheezing. Abdominal:      General: Abdomen is flat. Bowel sounds are normal. There is no distension. Palpations: Abdomen is soft. Tenderness: There is no abdominal tenderness. Musculoskeletal:         General: No swelling. Normal range of motion. Cervical back: Normal range of motion. No rigidity. Skin:     General: Skin is warm and dry. Capillary Refill: Capillary refill takes less than 2 seconds. Coloration: Skin is not jaundiced. Findings: No bruising. Neurological:      General: No focal deficit present. Mental Status: He is alert and oriented to person, place, and time. Mental status is at baseline. Cranial Nerves: No cranial nerve deficit. Sensory: No sensory deficit. Motor: No weakness.       Gait: Gait normal.   Psychiatric:         Mood and Affect: Mood normal.         Behavior: Behavior normal.         Vital Signs  ED Triage Vitals [07/10/23 2345]   Temperature Pulse Respirations Blood Pressure SpO2   97.8 °F (36.6 °C) 73 18 (!) 195/89 99 %      Temp Source Heart Rate Source Patient Position - Orthostatic VS BP Location FiO2 (%)   Oral Monitor Sitting Right arm --      Pain Score       No Pain           Vitals:    07/10/23 2345   BP: (!) 195/89   Pulse: 73   Patient Position - Orthostatic VS: Sitting         Visual Acuity      ED Medications  Medications - No data to display    Diagnostic Studies  Results Reviewed     None                 No orders to display Procedures  Procedures         ED Course                                             Medical Decision Making  45-year-old male presenting to ED today with a swelling of the left lower and right lower eyelids. Likely some sort of allergic conjunctivitis or allergic reaction secondary to a number of weeks he was pulling 2 weeks ago. I will add loratadine to his regimen which I sent a prescription to the pharmacy for. I gave him information to follow-up with ophthalmology should he not have any symptomatic relief. Strict return to ER precautions discussed with patient and patient was discharged home. Risk  OTC drugs. Disposition  Final diagnoses:   Swelling of left lower eyelid   Swelling of right lower eyelid     Time reflects when diagnosis was documented in both MDM as applicable and the Disposition within this note     Time User Action Codes Description Comment    7/10/2023 11:56 PM Jai Vila Add [H02.845] Swelling of left lower eyelid     7/10/2023 11:56 PM Jai Vila Add [H02.842] Swelling of right lower eyelid       ED Disposition     ED Disposition   Discharge    Condition   Stable    Date/Time   Mon Jul 10, 2023 11:56 PM    Comment   Amara Serrano discharge to home/self care.                Follow-up Information     Follow up With Specialties Details Why Contact Info Additional [address] [facility] Medicine Call  To schedule an appointment to establish care with a primary care provider [address] [id number] [address], [address] [facility] [place], [place], [address], [address] [facility] Emergency Department Emergency Medicine Go to  If symptoms worsen, As needed [address] [address]  [address] [facility] Emergency Department, [address] [facility] [address], [place] Small, [address] [place] [address] Yarelis Figueroa MD Ophthalmology Schedule an appointment as soon as possible for a visit  for follow up [address] [place] [address]  [phone]             Discharge Medication List as of 7/11/2023 12:00 AM      START taking these medications    Details   loratadine (CLARITIN) 10 mg tablet Take 1 tablet (10 mg total) by mouth daily, Starting Mon 7/10/2023, Normal         CONTINUE these medications which have NOT CHANGED    Details   apixaban (ELIQUIS) 5 mg Take 20 mg by mouth 2 (two) times a day, Historical Med      aspirin 81 mg chewable tablet Chew 81 mg daily, Historical Med      hydrOXYzine HCL (ATARAX) 25 mg tablet Take 2 tablets (50 mg total) by mouth every 6 (six) hours as needed for itching, Starting Fri 1/28/2022, Normal      predniSONE 10 mg tablet 5 po daily x2 days, then 4 po daily x2 days, then 3 po daily x2 days, then 2 po daily x2 days,then 1 po daily x2days, Normal      tamsulosin (FLOMAX) 0.4 mg Take 1 capsule (0.4 mg total) by mouth daily with dinner, Starting Mon 5/2/2022, Normal             No discharge procedures on file.     PDMP Review     None          ED Provider  Electronically Signed by           Rasta Loaiza MD  07/11/23 1317

## 2023-07-13 ENCOUNTER — HOSPITAL ENCOUNTER (EMERGENCY)
Facility: HOSPITAL | Age: 63
Discharge: HOME | End: 2023-07-13
Attending: STUDENT IN AN ORGANIZED HEALTH CARE EDUCATION/TRAINING PROGRAM
Payer: COMMERCIAL

## 2023-07-13 VITALS
SYSTOLIC BLOOD PRESSURE: 174 MMHG | OXYGEN SATURATION: 98 % | WEIGHT: 251 LBS | TEMPERATURE: 98.8 F | HEART RATE: 88 BPM | RESPIRATION RATE: 18 BRPM | BODY MASS INDEX: 34.04 KG/M2 | DIASTOLIC BLOOD PRESSURE: 77 MMHG

## 2023-07-13 DIAGNOSIS — J32.9 SINUSITIS: Primary | ICD-10-CM

## 2023-07-13 PROCEDURE — 99283 EMERGENCY DEPT VISIT LOW MDM: CPT

## 2023-07-13 RX ORDER — AMOXICILLIN AND CLAVULANATE POTASSIUM 875; 125 MG/1; MG/1
1 TABLET, FILM COATED ORAL EVERY 12 HOURS
Qty: 10 TABLET | Refills: 0 | Status: SHIPPED | OUTPATIENT
Start: 2023-07-13 | End: 2023-07-18

## 2023-07-13 RX ORDER — OXYMETAZOLINE HYDROCHLORIDE 0.05 G/100ML
2 SPRAY NASAL ONCE
Status: COMPLETED | OUTPATIENT
Start: 2023-07-13 | End: 2023-07-13

## 2023-07-13 RX ORDER — ACETAMINOPHEN 325 MG/1
975 TABLET ORAL ONCE
Status: COMPLETED | OUTPATIENT
Start: 2023-07-13 | End: 2023-07-13

## 2023-07-13 RX ADMIN — ACETAMINOPHEN 975 MG: 325 TABLET ORAL at 19:16

## 2023-07-13 RX ADMIN — OXYMETAZOLINE HYDROCHLORIDE 2 SPRAY: 0.05 SPRAY NASAL at 19:17

## 2023-07-13 NOTE — ED PROVIDER NOTES
History  Chief Complaint   Patient presents with   • Eye Problem     States he has pressure " under " his eyes. States was treated with steroid about 3 weeks ago for poison ivy. Also, he thinks he had " brake clean " on his hands and may have rubbed his face 3 weeks ago. Seen here on 7/10 and told to take Loratidine, but states it is not helping. Patient is a 49-year-old male, no pertinent past medical history, who presents to the emergency department for pressure under his eyes. Patient states that about 3 weeks ago he thought he had poison ivy so he treated himself with rubbing alcohol. He used rubbing alcohol on his hands and under his eyes (location of the rash). He also notes around that time he had brake  on his hands and may have touched under his eyes. Was initially seen at urgent care where he was started on steroids (which he completed). Since then he has had this pressure sensation under his eyes, under his eyes has been swollen, and he has noticed some whitening of the skin around his eyes. Has tried loratadine without relief. No other modifying factors. No associated symptoms. No fevers or chills. No pain to his eyes. No visual changes. No prior history of symptoms like this in the past.  No other complaints or concerns. Chart reviewed. Patient seen here in the emergency department on 7/10/2023 for identical symptoms. Thought to be secondary to allergic reaction. Was started on loratidine. Prior to Admission Medications   Prescriptions Last Dose Informant Patient Reported? Taking?    apixaban (ELIQUIS) 5 mg   Yes No   Sig: Take 20 mg by mouth 2 (two) times a day   Patient not taking: Reported on 11/3/2021   aspirin 81 mg chewable tablet   Yes No   Sig: Chew 81 mg daily   hydrOXYzine HCL (ATARAX) 25 mg tablet   No No   Sig: Take 2 tablets (50 mg total) by mouth every 6 (six) hours as needed for itching   Patient not taking: Reported on 5/1/2022    loratadine (CLARITIN) 10 mg tablet   No No   Sig: Take 1 tablet (10 mg total) by mouth daily   predniSONE 10 mg tablet   No No   Si po daily x2 days, then 4 po daily x2 days, then 3 po daily x2 days, then 2 po daily x2 days,then 1 po daily x2days   Patient not taking: Reported on 2022    tamsulosin (FLOMAX) 0.4 mg   No No   Sig: Take 1 capsule (0.4 mg total) by mouth daily with dinner      Facility-Administered Medications: None       Past Medical History:   Diagnosis Date   • DVT of lower limb, acute (HCC)    • Pneumonia    • Rheumatic fever        Past Surgical History:   Procedure Laterality Date   • TONSILLECTOMY         History reviewed. No pertinent family history. I have reviewed and agree with the history as documented. E-Cigarette/Vaping   • E-Cigarette Use Never User      E-Cigarette/Vaping Substances   • Nicotine No    • THC No    • CBD No    • Flavoring No    • Other No    • Unknown No      Social History     Tobacco Use   • Smoking status: Never   • Smokeless tobacco: Never   Vaping Use   • Vaping Use: Never used   Substance Use Topics   • Alcohol use: Never   • Drug use: Never       Review of Systems   Constitutional: Negative for chills and fever. HENT: Positive for sinus pressure. Negative for congestion. Eyes: Negative for photophobia, pain, discharge and visual disturbance. Respiratory: Negative for shortness of breath. Cardiovascular: Negative for chest pain. Gastrointestinal: Negative for nausea and vomiting. All other systems reviewed and are negative. Physical Exam  Physical Exam  Vitals and nursing note reviewed. Constitutional:       General: He is not in acute distress. Appearance: He is well-developed. He is not ill-appearing, toxic-appearing or diaphoretic. HENT:      Head: Normocephalic and atraumatic. Right Ear: External ear normal.      Left Ear: External ear normal.      Nose: Nose normal.   Eyes:      General: Lids are normal. No scleral icterus. Comments: Mild amount of swelling to the bilateral inferior eyes. Some pallor noted. No significant tenderness   Cardiovascular:      Rate and Rhythm: Normal rate and regular rhythm. Pulmonary:      Effort: Pulmonary effort is normal. No respiratory distress. Skin:     General: Skin is warm and dry. Neurological:      General: No focal deficit present. Mental Status: He is alert. Psychiatric:         Mood and Affect: Mood normal.         Behavior: Behavior normal.         Vital Signs  ED Triage Vitals [07/13/23 1727]   Temperature Pulse Respirations Blood Pressure SpO2   98.8 °F (37.1 °C) 88 18 (!) 174/77 98 %      Temp Source Heart Rate Source Patient Position - Orthostatic VS BP Location FiO2 (%)   Tympanic Monitor Sitting Left arm --      Pain Score       --           Vitals:    07/13/23 1727   BP: (!) 174/77   Pulse: 88   Patient Position - Orthostatic VS: Sitting         Visual Acuity      ED Medications  Medications   oxymetazoline (AFRIN) 0.05 % nasal spray 2 spray (2 sprays Each Nare Given 7/13/23 1917)   acetaminophen (TYLENOL) tablet 975 mg (975 mg Oral Given 7/13/23 1916)       Diagnostic Studies  Results Reviewed     None                 No orders to display              Procedures  Procedures         ED Course  ED Course as of 07/13/23 1949   Thu Jul 13, 2023 1935 Patient reevaluated. Resting comfortably. States he feels "different" and better. Explained unclear the cause of his symptoms. Will treat for possible sinusitis and discharge. Recommended ENT follow-up if his symptoms persist.  Return precautions discussed. Patient verbalized understanding and agreed to plan of care. SBIRT 20yo+    Flowsheet Row Most Recent Value   Initial Alcohol Screen: US AUDIT-C     1. How often do you have a drink containing alcohol? 0 Filed at: 07/13/2023 1729   Audit-C Score 0 Filed at: 07/13/2023 1729   ADRIANO: How many times in the past year have you. ..     Used an illegal drug or used a prescription medication for non-medical reasons? Never Filed at: 07/13/2023 1729                    Medical Decision Making  Patient is a 61 y.o. male who presents to the ED for facial pressure. Patient is nontoxic and well-appearing. He is hypertensive but otherwise vitals are stable. Unclear etiology of presentation. Very unlikely to be related to the brake  that patient was possibly exposed to. Question whether this is possibly sinusitis given the ethmoid and maxillary sinuses seem to be involved. .    Plan: Reassurance, trial of Tylenol and Afrin, reassess                 Portions of the record may have been created with voice recognition software. Occasional wrong word or "sound a like" substitutions may have occurred due to the inherent limitations of voice recognition software. Read the chart carefully and recognize, using context, where substitutions have occurred. Sinusitis: acute illness or injury  Amount and/or Complexity of Data Reviewed  External Data Reviewed: notes. Risk  OTC drugs. Prescription drug management. Disposition  Final diagnoses:   Sinusitis     Time reflects when diagnosis was documented in both MDM as applicable and the Disposition within this note     Time User Action Codes Description Comment    7/13/2023  7:32 PM Domitila Lennon Add [J32.9] Sinusitis       ED Disposition     ED Disposition   Discharge    Condition   Stable    Date/Time   Thu Jul 13, 2023  7:32 PM    Comment   Tammi Medrano discharge to home/self care.                Follow-up Information     Follow up With Specialties Details Why Contact Info Additional Information    [employer]  Call in 1 day  [address] [facility] Emergency Department Emergency Medicine  As needed [address] [address]  [address] [facility] Emergency Department, [address], [place], [facility] [address]    [facility] [place] E[facility] Isabel Hitchcock Otolaryngology Schedule an appointment as soon as possible for a visit   [address] [facility]  [place] [address]  [address] [facility] ENT Isabel Hitchcock [address] [facility], [address] [place] [address], [id number], [phone]          Discharge Medication List as of 7/13/2023  7:34 PM      START taking these medications    Details   amoxicillin-clavulanate (AUGMENTIN) 875-125 mg per tablet Take 1 tablet by mouth every 12 (twelve) hours for 5 days, Starting Thu 7/13/2023, Until Tue 7/18/2023, Normal         CONTINUE these medications which have NOT CHANGED    Details   loratadine (CLARITIN) 10 mg tablet Take 1 tablet (10 mg total) by mouth daily, Starting Mon 7/10/2023, Normal      apixaban (ELIQUIS) 5 mg Take 20 mg by mouth 2 (two) times a day, Historical Med      aspirin 81 mg chewable tablet Chew 81 mg daily, Historical Med      hydrOXYzine HCL (ATARAX) 25 mg tablet Take 2 tablets (50 mg total) by mouth every 6 (six) hours as needed for itching, Starting Fri 1/28/2022, Normal      predniSONE 10 mg tablet 5 po daily x2 days, then 4 po daily x2 days, then 3 po daily x2 days, then 2 po daily x2 days,then 1 po daily x2days, Normal      tamsulosin (FLOMAX) 0.4 mg Take 1 capsule (0.4 mg total) by mouth daily with dinner, Starting Mon 5/2/2022, Normal             No discharge procedures on file.     PDMP Review     None          ED Provider  Electronically Signed by           Kurt Espinoza DO  07/13/23 Rashmi

## 2023-07-13 NOTE — DISCHARGE INSTRUCTIONS
You were seen in the emergency department for facial fullness. It is unclear exactly what is causing your symptoms but it is possible this is due to sinusitis. Please take the antibiotics that are prescribed. If you have no improvement in your symptoms you should follow-up with the ear nose and throat doctor. Call to schedule an appointment. Return to the emergency department for any fevers, chills, pain, visual changes, neck pain or stiffness, or for any other new or concerning symptoms.

## 2023-09-06 ENCOUNTER — HOSPITAL ENCOUNTER (EMERGENCY)
Facility: HOSPITAL | Age: 63
Discharge: HOME | End: 2023-09-06
Attending: EMERGENCY MEDICINE | Admitting: EMERGENCY MEDICINE
Payer: COMMERCIAL

## 2023-09-06 ENCOUNTER — APPOINTMENT (EMERGENCY)
Dept: RADIOLOGY | Facility: HOSPITAL | Age: 63
End: 2023-09-06
Payer: COMMERCIAL

## 2023-09-06 VITALS
TEMPERATURE: 98.2 F | WEIGHT: 253 LBS | RESPIRATION RATE: 20 BRPM | OXYGEN SATURATION: 99 % | DIASTOLIC BLOOD PRESSURE: 84 MMHG | SYSTOLIC BLOOD PRESSURE: 176 MMHG | HEART RATE: 79 BPM | BODY MASS INDEX: 34.27 KG/M2 | HEIGHT: 72 IN

## 2023-09-06 DIAGNOSIS — R07.89 CHEST TIGHTNESS: ICD-10-CM

## 2023-09-06 DIAGNOSIS — R05.8 PRODUCTIVE COUGH: Primary | ICD-10-CM

## 2023-09-06 LAB
ALBUMIN SERPL BCP-MCNC: 4.2 G/DL (ref 3.5–5)
ALP SERPL-CCNC: 96 U/L (ref 34–104)
ALT SERPL W P-5'-P-CCNC: 16 U/L (ref 7–52)
ANION GAP SERPL CALCULATED.3IONS-SCNC: 5 MMOL/L
AST SERPL W P-5'-P-CCNC: 16 U/L (ref 13–39)
ATRIAL RATE: 80 BPM
BASOPHILS # BLD AUTO: 0.05 THOUSANDS/ÂΜL (ref 0–0.1)
BASOPHILS NFR BLD AUTO: 1 % (ref 0–1)
BILIRUB SERPL-MCNC: 0.53 MG/DL (ref 0.2–1)
BUN SERPL-MCNC: 16 MG/DL (ref 5–25)
CALCIUM SERPL-MCNC: 9.3 MG/DL (ref 8.4–10.2)
CHLORIDE SERPL-SCNC: 107 MMOL/L (ref 96–108)
CO2 SERPL-SCNC: 28 MMOL/L (ref 21–32)
CREAT SERPL-MCNC: 1.16 MG/DL (ref 0.6–1.3)
D DIMER PPP FEU-MCNC: 0.98 UG/ML FEU
EOSINOPHIL # BLD AUTO: 0.06 THOUSAND/ÂΜL (ref 0–0.61)
EOSINOPHIL NFR BLD AUTO: 1 % (ref 0–6)
ERYTHROCYTE [DISTWIDTH] IN BLOOD BY AUTOMATED COUNT: 13 % (ref 11.6–15.1)
FLUAV RNA RESP QL NAA+PROBE: NEGATIVE
FLUBV RNA RESP QL NAA+PROBE: NEGATIVE
GFR SERPL CREATININE-BSD FRML MDRD: 66 ML/MIN/1.73SQ M
GLUCOSE SERPL-MCNC: 119 MG/DL (ref 65–140)
HCT VFR BLD AUTO: 47.9 % (ref 36.5–49.3)
HGB BLD-MCNC: 15.7 G/DL (ref 12–17)
IMM GRANULOCYTES # BLD AUTO: 0.05 THOUSAND/UL (ref 0–0.2)
IMM GRANULOCYTES NFR BLD AUTO: 1 % (ref 0–2)
LYMPHOCYTES # BLD AUTO: 1.55 THOUSANDS/ÂΜL (ref 0.6–4.47)
LYMPHOCYTES NFR BLD AUTO: 24 % (ref 14–44)
MCH RBC QN AUTO: 30.1 PG (ref 26.8–34.3)
MCHC RBC AUTO-ENTMCNC: 32.8 G/DL (ref 31.4–37.4)
MCV RBC AUTO: 92 FL (ref 82–98)
MONOCYTES # BLD AUTO: 0.6 THOUSAND/ÂΜL (ref 0.17–1.22)
MONOCYTES NFR BLD AUTO: 9 % (ref 4–12)
NEUTROPHILS # BLD AUTO: 4.23 THOUSANDS/ÂΜL (ref 1.85–7.62)
NEUTS SEG NFR BLD AUTO: 64 % (ref 43–75)
NRBC BLD AUTO-RTO: 0 /100 WBCS
P AXIS: 53 DEGREES
PLATELET # BLD AUTO: 153 THOUSANDS/UL (ref 149–390)
PMV BLD AUTO: 10.8 FL (ref 8.9–12.7)
POTASSIUM SERPL-SCNC: 4.5 MMOL/L (ref 3.5–5.3)
PR INTERVAL: 208 MS
PROT SERPL-MCNC: 7.3 G/DL (ref 6.4–8.4)
QRS AXIS: 65 DEGREES
QRSD INTERVAL: 88 MS
QT INTERVAL: 378 MS
QTC INTERVAL: 435 MS
RBC # BLD AUTO: 5.22 MILLION/UL (ref 3.88–5.62)
RSV RNA RESP QL NAA+PROBE: NEGATIVE
SARS-COV-2 RNA RESP QL NAA+PROBE: NEGATIVE
SODIUM SERPL-SCNC: 140 MMOL/L (ref 135–147)
T WAVE AXIS: 15 DEGREES
VENTRICULAR RATE: 80 BPM
WBC # BLD AUTO: 6.54 THOUSAND/UL (ref 4.31–10.16)

## 2023-09-06 PROCEDURE — 85025 COMPLETE CBC W/AUTO DIFF WBC: CPT | Performed by: EMERGENCY MEDICINE

## 2023-09-06 PROCEDURE — 85379 FIBRIN DEGRADATION QUANT: CPT | Performed by: EMERGENCY MEDICINE

## 2023-09-06 PROCEDURE — 0241U HB NFCT DS VIR RESP RNA 4 TRGT: CPT | Performed by: EMERGENCY MEDICINE

## 2023-09-06 PROCEDURE — 36415 COLL VENOUS BLD VENIPUNCTURE: CPT | Performed by: EMERGENCY MEDICINE

## 2023-09-06 PROCEDURE — 99285 EMERGENCY DEPT VISIT HI MDM: CPT | Performed by: EMERGENCY MEDICINE

## 2023-09-06 PROCEDURE — 71275 CT ANGIOGRAPHY CHEST: CPT

## 2023-09-06 PROCEDURE — 93010 ELECTROCARDIOGRAM REPORT: CPT | Performed by: INTERNAL MEDICINE

## 2023-09-06 PROCEDURE — G1004 CDSM NDSC: HCPCS

## 2023-09-06 PROCEDURE — 99285 EMERGENCY DEPT VISIT HI MDM: CPT

## 2023-09-06 PROCEDURE — 80053 COMPREHEN METABOLIC PANEL: CPT | Performed by: EMERGENCY MEDICINE

## 2023-09-06 PROCEDURE — 93005 ELECTROCARDIOGRAM TRACING: CPT

## 2023-09-06 RX ADMIN — IOHEXOL 85 ML: 350 INJECTION, SOLUTION INTRAVENOUS at 09:37

## 2023-09-06 NOTE — DISCHARGE INSTRUCTIONS
Follow-up with primary care for further care. Contact info provided below if needed. Use over the counter medications as stated on the bottle as needed for symptom control. Stay hydrated. Return to the ED with new or worsening symptoms.

## 2023-09-06 NOTE — ED PROVIDER NOTES
History  Chief Complaint   Patient presents with   • Shortness of Breath     Pt c/o pressure below sternum and at bases of lungs when taking a deep breath. Thick feeling to his throat and yellow sputum. States that symptoms feel like when he last had pneumonia. No fever. No respiratory distress. Pt is a 59yo M who presents for cough and chest tightness. Patient reports starting 5 days ago he began with cough and chest tightness. Patient reports his cough has been productive of yellow sputum. Patient also reports chest tightness particularly with deep breaths or with coughing. Patient states his chest tightness is bilateral.  Patient denies any true chest pain. Patient does report associated shortness of breath. Patient denies any associated fevers, chills, or systemic symptoms. Patient does have history of DVT and is not currently on anticoagulants. Patient also reports history of pneumonia and states this feels similar to that. Patient reports he is otherwise healthy with no daily medications. Prior to Admission Medications   Prescriptions Last Dose Informant Patient Reported? Taking?    apixaban (ELIQUIS) 5 mg   Yes No   Sig: Take 20 mg by mouth 2 (two) times a day   Patient not taking: Reported on 11/3/2021   aspirin 81 mg chewable tablet   Yes No   Sig: Chew 81 mg daily   hydrOXYzine HCL (ATARAX) 25 mg tablet   No No   Sig: Take 2 tablets (50 mg total) by mouth every 6 (six) hours as needed for itching   Patient not taking: Reported on 2022    loratadine (CLARITIN) 10 mg tablet   No No   Sig: Take 1 tablet (10 mg total) by mouth daily   predniSONE 10 mg tablet   No No   Si po daily x2 days, then 4 po daily x2 days, then 3 po daily x2 days, then 2 po daily x2 days,then 1 po daily x2days   Patient not taking: Reported on 2022    tamsulosin (FLOMAX) 0.4 mg   No No   Sig: Take 1 capsule (0.4 mg total) by mouth daily with dinner      Facility-Administered Medications: None       Past Medical History:   Diagnosis Date   • DVT of lower limb, acute ([address])    • Pneumonia    • Rheumatic fever        Past Surgical History:   Procedure Laterality Date   • TONSILLECTOMY         History reviewed. No pertinent family history. I have reviewed and agree with the history as documented. E-Cigarette/Vaping   • E-Cigarette Use Never User      E-Cigarette/Vaping Substances   • Nicotine No    • THC No    • CBD No    • Flavoring No    • Other No    • Unknown No      Social History     Tobacco Use   • Smoking status: Never   • Smokeless tobacco: Never   Vaping Use   • Vaping Use: Never used   Substance Use Topics   • Alcohol use: Never   • Drug use: Never       Review of Systems   Respiratory: Positive for cough, chest tightness and shortness of breath. All other systems reviewed and are negative. Physical Exam  Physical Exam  Vitals reviewed. Constitutional:       General: He is not in acute distress. Appearance: He is well-developed. He is not toxic-appearing or diaphoretic. HENT:      Head: Normocephalic and atraumatic. Right Ear: External ear normal.      Left Ear: External ear normal.      Nose: Nose normal.      Mouth/Throat:      Pharynx: Oropharynx is clear. Eyes:      Extraocular Movements: Extraocular movements intact. Pupils: Pupils are equal, round, and reactive to light. Cardiovascular:      Rate and Rhythm: Normal rate and regular rhythm. Heart sounds: Normal heart sounds. Pulmonary:      Effort: Pulmonary effort is normal. No respiratory distress. Breath sounds: No stridor. No wheezing or rales. Abdominal:      General: There is no distension. Palpations: Abdomen is soft. Tenderness: There is no abdominal tenderness. Musculoskeletal:         General: Normal range of motion. Cervical back: Neck supple. Skin:     General: Skin is warm and dry. Capillary Refill: Capillary refill takes less than 2 seconds.       Coloration: Skin is not pale.      Findings: No erythema or rash. Neurological:      General: No focal deficit present. Mental Status: He is alert and oriented to person, place, and time. Psychiatric:         Speech: Speech normal.         Behavior: Behavior is cooperative. Vital Signs  ED Triage Vitals [09/06/23 0803]   Temperature Pulse Respirations Blood Pressure SpO2   98.2 °F (36.8 °C) 79 20 (!) 176/84 99 %      Temp Source Heart Rate Source Patient Position - Orthostatic VS BP Location FiO2 (%)   Oral Monitor Sitting Left arm --      Pain Score       No Pain           Vitals:    09/06/23 0803   BP: (!) 176/84   Pulse: 79   Patient Position - Orthostatic VS: Sitting         Visual Acuity      ED Medications  Medications   iohexol (OMNIPAQUE) 350 MG/ML injection (SINGLE-DOSE) 85 mL (85 mL Intravenous Given 9/6/23 0937)       Diagnostic Studies  Results Reviewed     Procedure Component Value Units Date/Time    FLU/RSV/COVID - if FLU/RSV clinically relevant [[id number]]  (Normal) Collected: 09/06/23 0836    Lab Status: Final result Specimen: Nares from Nose Updated: 09/06/23 0942     SARS-CoV-2 Negative     INFLUENZA A PCR Negative     INFLUENZA B PCR Negative     RSV PCR Negative    Narrative:      FOR PEDIATRIC PATIENTS - copy/paste COVID Guidelines URL to browser: [email or web address]    SARS-CoV-2 assay is a Nucleic Acid Amplification assay intended for the  qualitative detection of nucleic acid from SARS-CoV-2 in nasopharyngeal  swabs. Results are for the presumptive identification of SARS-CoV-2 RNA. Positive results are indicative of infection with SARS-CoV-2, the virus  causing COVID-19, but do not rule out bacterial infection or co-infection  with other viruses. Laboratories within the [facility] and its  territories are required to report all positive results to the appropriate  public health authorities.  Negative results do not preclude SARS-CoV-2  infection and should not be used as the sole basis for treatment or other  patient management decisions. Negative results must be combined with  clinical observations, patient history, and epidemiological information. This test has not been FDA cleared or approved. This test has been authorized by FDA under an Emergency Use Authorization  (EUA). This test is only authorized for the duration of time the  declaration that circumstances exist justifying the authorization of the  emergency use of an in vitro diagnostic tests for detection of SARS-CoV-2  virus and/or diagnosis of COVID-19 infection under section 564(b)(1) of  the Act, 21 U. S.C. 613FBT-4(C)(0), unless the authorization is terminated  or revoked sooner. The test has been validated but independent review by FDA  and CLIA is pending. Test performed using [employer] GeneXpert: This RT-PCR assay targets N2,  a region unique to SARS-CoV-2. A conserved region in the E-gene was chosen  for pan-Sarbecovirus detection which includes SARS-CoV-2. According to CMS-2020-01-R, this platform meets the definition of high-throughput technology. D-Dimer [[id number]]  (Abnormal) Collected: 09/06/23 0836    Lab Status: Final result Specimen: Blood from Arm, Right Updated: 09/06/23 0910     D-Dimer, Quant 0.98 ug/ml FEU     Narrative: In the evaluation for possible pulmonary embolism, in the appropriate (Well's Score of 4 or less) patient, the age adjusted d-dimer cutoff for this patient can be calculated as:    Age x 0.01 (in ug/mL) for Age-adjusted D-dimer exclusion threshold for a patient over 50 years.     Comprehensive metabolic panel [[id number]] Collected: 09/06/23 0836    Lab Status: Final result Specimen: Blood from Arm, Right Updated: 09/06/23 0909     Sodium 140 mmol/L      Potassium 4.5 mmol/L      Chloride 107 mmol/L      CO2 28 mmol/L      ANION GAP 5 mmol/L      BUN 16 mg/dL      Creatinine 1.16 mg/dL      Glucose 119 mg/dL      Calcium 9.3 mg/dL      AST 16 U/L      ALT 16 U/L      Alkaline Phosphatase 96 U/L      Total Protein 7.3 g/dL      Albumin 4.2 g/dL      Total Bilirubin 0.53 mg/dL      eGFR 66 ml/min/1.73sq m     Narrative:      [place] guidelines for Chronic Kidney Disease (CKD):   •  Stage 1 with normal or high GFR (GFR > 90 mL/min/1.73 square meters)  •  Stage 2 Mild CKD (GFR = 60-89 mL/min/1.73 square meters)  •  Stage 3A Moderate CKD (GFR = 45-59 mL/min/1.73 square meters)  •  Stage 3B Moderate CKD (GFR = 30-44 mL/min/1.73 square meters)  •  Stage 4 Severe CKD (GFR = 15-29 mL/min/1.73 square meters)  •  Stage 5 End Stage CKD (GFR <15 mL/min/1.73 square meters)  Note: GFR calculation is accurate only with a steady state creatinine    CBC and differential [[id number]] Collected: 09/06/23 0836    Lab Status: Final result Specimen: Blood from Arm, Right Updated: 09/06/23 0848     WBC 6.54 Thousand/uL      RBC 5.22 Million/uL      Hemoglobin 15.7 g/dL      Hematocrit 47.9 %      MCV 92 fL      MCH 30.1 pg      MCHC 32.8 g/dL      RDW 13.0 %      MPV 10.8 fL      Platelets 757 Thousands/uL      nRBC 0 /100 WBCs      Neutrophils Relative 64 %      Immat GRANS % 1 %      Lymphocytes Relative 24 %      Monocytes Relative 9 %      Eosinophils Relative 1 %      Basophils Relative 1 %      Neutrophils Absolute 4.23 Thousands/µL      Immature Grans Absolute 0.05 Thousand/uL      Lymphocytes Absolute 1.55 Thousands/µL      Monocytes Absolute 0.60 Thousand/µL      Eosinophils Absolute 0.06 Thousand/µL      Basophils Absolute 0.05 Thousands/µL                  CTA ED chest PE Study   Final Result by Katia Lees MD (09/06 1004)      No evidence for acute pulmonary embolus. Minimal peripheral linear opacity mid axillary line left lower lobe and base of the lingula which may present either plate atelectasis or scar. No pleural fluid collections. 2 small pulmonary nodules as detailed above. Consider follow-up in 1 year depending upon level of clinical risk factors. The study was marked in EPIC for significant notification. Workstation performed: [id number]                    Procedures  Procedures         ED Course  ED Course as of 09/06/23 1657   Wed Sep 06, 2023   0850 CBC and differential  Reviewed and without actionable derangement. 8768 Procedure Note: EKG  Date/Time: 09/06/23 8:50 AM   Interpreted by: Paulino Balderas MD  Indications / Diagnosis: CP  ECG reviewed by me, the ED Physician: yes   The EKG demonstrates:  Rhythm: normal sinus  Intervals: Slightly prolonged [place] consistent with 1st degree AV block3  Axis: normal axis  QRS/Blocks: normal QRS  ST Changes: No acute ST Changes, no STD/ISELA. S1Q3T3 present. PE w/u in process. 2651 Comprehensive metabolic panel  Reviewed and without actionable derangement. 1442 D-Dimer, Quant(!): 0.98  Will proceed to CTA for PE r/o.    9304 FLU/RSV/COVID - if FLU/RSV clinically relevant  Negative. 200 CTA ED chest PE Study  No evidence for acute pulmonary embolus. Minimal peripheral linear opacity mid axillary line left lower lobe and base of the lingula which may present either plate atelectasis or scar. No pleural fluid collections. 2 small pulmonary nodules as detailed above. Consider follow-up in 1 year depending upon level of clinical risk factors. 1018 Reassessed pt who is resting comfortably. Discussed all results including incidental lung nodules. Discussed likely viral syndrome vs bronchitis but as pt does not have history of DM, chronic steroid use, or other risk factors, abx not indicated at this time. Will recommend supportive treatment at home and PCP follow-up. Pt states he does not have PCP, will refer to [facility].                         [address] Rule for PE    Flowsheet Row Most Recent Value   PERC Rule for PE    Age >=50 1 Filed at: 09/06/2023 0819   HR >=100 0 Filed at: 09/06/2023 0819   O2 Sat on room air < 95% 0 Filed at: 09/06/2023 8551   History of PE or DVT 1 Filed at: 09/06/2023 1647   Recent trauma or surgery 0 Filed at: 09/06/2023 0819   Hemoptysis 0 Filed at: 09/06/2023 8644   Exogenous estrogen 0 Filed at: 09/06/2023 0819   Unilateral leg swelling 0 Filed at: 09/06/2023 0929   PERC Rule for PE Results 2 Filed at: 09/06/2023 6692              SBIRT 22yo+    Flowsheet Row Most Recent Value   Initial Alcohol Screen: US AUDIT-C     1. How often do you have a drink containing alcohol? 0 Filed at: 09/06/2023 0802   2. How many drinks containing alcohol do you have on a typical day you are drinking? 0 Filed at: 09/06/2023 0802   3a. Male UNDER 65: How often do you have five or more drinks on one occasion? 0 Filed at: 09/06/2023 0802   3b. FEMALE Any Age, or MALE 65+: How often do you have 4 or more drinks on one occassion? 0 Filed at: 09/06/2023 0802   Audit-C Score 0 Filed at: 09/06/2023 6696   ADRIANO: How many times in the past year have you. .. Used an illegal drug or used a prescription medication for non-medical reasons? Never Filed at: 09/06/2023 3285          Wells' Criteria for PE    Flowsheet Row Most Recent Value   Wells' Criteria for PE    Clinical signs and symptoms of DVT 0 Filed at: 09/06/2023 7251   PE is primary diagnosis or equally likely 0 Filed at: 09/06/2023 0818   HR >100 0 Filed at: 09/06/2023 0818   Immobilization at least 3 days or Surgery in the previous 4 weeks 0 Filed at: 09/06/2023 0818   Previous, objectively diagnosed PE or DVT 1.5 Filed at: 09/06/2023 0818   Hemoptysis 0 Filed at: 09/06/2023 0818   Malignancy with treatment within 6 months or palliative 0 Filed at: 09/06/2023 0818   Wells' Criteria Total 1.5 Filed at: 09/06/2023 0818                Medical Decision Making  Pt is a 59yo M who presents with cough and chest tightness. Differential diagnosis to include but not limited to pneumonia, bronchitis, viral syndrome, PE.   Due to patient's history of DVT along with pleuritic chest pain as part of patient's complaint, will obtain D-dimer to rule out PE. Will also obtain basic labs as well as EKG and viral swab. Will obtain chest imaging based on D-dimer result. See ED course for results and details. Plan to discharge pt with f/u to PCP. Discussed returning the ED with new or worsening of symptoms. Discussed use of over the counter medications as stated on the bottle as needed for symptoms. Pt expressed understanding of discharge instructions, return precautions, and medication instructions and is stable for discharge at this time. All questions were answered and pt was discharged without incident. Amount and/or Complexity of Data Reviewed  Labs: ordered. Decision-making details documented in ED Course. Radiology: ordered. Decision-making details documented in ED Course. Risk  Prescription drug management. Disposition  Final diagnoses:   Productive cough   Chest tightness     Time reflects when diagnosis was documented in both MDM as applicable and the Disposition within this note     Time User Action Codes Description Comment    9/6/2023 10:21 AM Victor Manuel Cooper Add [R05.8] Productive cough     9/6/2023 10:21 AM Victor Manuel Cooper Add [R07.89] Chest tightness       ED Disposition     ED Disposition   Discharge    Condition   Stable    Date/Time   Wed Sep 6, 2023 10:21 AM    Comment   Mara Reyes discharge to home/self care.                Follow-up Information     Follow up With Specialties Details Why [id number] [facility] Medicine Call on 9/6/2023  [address]  [phone]            Discharge Medication List as of 9/6/2023 10:22 AM      CONTINUE these medications which have NOT CHANGED    Details   apixaban (ELIQUIS) 5 mg Take 20 mg by mouth 2 (two) times a day, Historical Med      aspirin 81 mg chewable tablet Chew 81 mg daily, Historical Med      hydrOXYzine HCL (ATARAX) 25 mg tablet Take 2 tablets (50 mg total) by mouth every 6 (six) hours as needed for itching, Starting Fri 1/28/2022, Normal      loratadine (CLARITIN) 10 mg tablet Take 1 tablet (10 mg total) by mouth daily, Starting Mon 7/10/2023, Normal      predniSONE 10 mg tablet 5 po daily x2 days, then 4 po daily x2 days, then 3 po daily x2 days, then 2 po daily x2 days,then 1 po daily x2days, Normal      tamsulosin (FLOMAX) 0.4 mg Take 1 capsule (0.4 mg total) by mouth daily with dinner, Starting Mon 5/2/2022, Normal             No discharge procedures on file.     PDMP Review     None          ED Provider  Electronically Signed by           Radha Fajardo MD  09/06/23 7894

## 2023-09-06 NOTE — Clinical Note
Barb Sequeiran was seen and treated in our emergency department on 9/6/2023. Diagnosis:     Ny Frank  may return to work on return date. He may return on this date: 09/07/2023         If you have any questions or concerns, please don't hesitate to call.       Glory Brandt MD    ______________________________           _______________          _______________  Hospital Representative                              Date                                Time

## 2024-07-06 ENCOUNTER — HOSPITAL ENCOUNTER (EMERGENCY)
Facility: HOSPITAL | Age: 64
Discharge: HOME | End: 2024-07-07
Attending: EMERGENCY MEDICINE
Payer: COMMERCIAL

## 2024-07-06 VITALS
OXYGEN SATURATION: 97 % | RESPIRATION RATE: 20 BRPM | WEIGHT: 256 LBS | BODY MASS INDEX: 34.72 KG/M2 | DIASTOLIC BLOOD PRESSURE: 84 MMHG | TEMPERATURE: 98.4 F | HEART RATE: 77 BPM | SYSTOLIC BLOOD PRESSURE: 180 MMHG

## 2024-07-06 DIAGNOSIS — N30.10 INTERSTITIAL CYSTITIS: Primary | ICD-10-CM

## 2024-07-06 DIAGNOSIS — N40.0 PROSTATE ENLARGEMENT: ICD-10-CM

## 2024-07-06 LAB
BASOPHILS # BLD AUTO: 0.05 THOUSANDS/ÂΜL (ref 0–0.1)
BASOPHILS NFR BLD AUTO: 1 % (ref 0–1)
BILIRUB UR QL STRIP: NEGATIVE
CLARITY UR: CLEAR
COLOR UR: COLORLESS
EOSINOPHIL # BLD AUTO: 0.09 THOUSAND/ÂΜL (ref 0–0.61)
EOSINOPHIL NFR BLD AUTO: 1 % (ref 0–6)
ERYTHROCYTE [DISTWIDTH] IN BLOOD BY AUTOMATED COUNT: 13.1 % (ref 11.6–15.1)
GLUCOSE UR STRIP-MCNC: NEGATIVE MG/DL
HCT VFR BLD AUTO: 44.6 % (ref 36.5–49.3)
HGB BLD-MCNC: 14.6 G/DL (ref 12–17)
HGB UR QL STRIP.AUTO: NEGATIVE
IMM GRANULOCYTES # BLD AUTO: 0.06 THOUSAND/UL (ref 0–0.2)
IMM GRANULOCYTES NFR BLD AUTO: 1 % (ref 0–2)
KETONES UR STRIP-MCNC: NEGATIVE MG/DL
LEUKOCYTE ESTERASE UR QL STRIP: NEGATIVE
LYMPHOCYTES # BLD AUTO: 1.39 THOUSANDS/ÂΜL (ref 0.6–4.47)
LYMPHOCYTES NFR BLD AUTO: 16 % (ref 14–44)
MCH RBC QN AUTO: 29.4 PG (ref 26.8–34.3)
MCHC RBC AUTO-ENTMCNC: 32.7 G/DL (ref 31.4–37.4)
MCV RBC AUTO: 90 FL (ref 82–98)
MONOCYTES # BLD AUTO: 0.68 THOUSAND/ÂΜL (ref 0.17–1.22)
MONOCYTES NFR BLD AUTO: 8 % (ref 4–12)
NEUTROPHILS # BLD AUTO: 6.37 THOUSANDS/ÂΜL (ref 1.85–7.62)
NEUTS SEG NFR BLD AUTO: 73 % (ref 43–75)
NITRITE UR QL STRIP: NEGATIVE
NRBC BLD AUTO-RTO: 0 /100 WBCS
PH UR STRIP.AUTO: 5 [PH]
PLATELET # BLD AUTO: 261 THOUSANDS/UL (ref 149–390)
PMV BLD AUTO: 10.1 FL (ref 8.9–12.7)
PROT UR STRIP-MCNC: NEGATIVE MG/DL
RBC # BLD AUTO: 4.97 MILLION/UL (ref 3.88–5.62)
SP GR UR STRIP.AUTO: <1.005 (ref 1–1.03)
UROBILINOGEN UR STRIP-ACNC: <2 MG/DL
WBC # BLD AUTO: 8.64 THOUSAND/UL (ref 4.31–10.16)

## 2024-07-06 PROCEDURE — 81003 URINALYSIS AUTO W/O SCOPE: CPT | Performed by: EMERGENCY MEDICINE

## 2024-07-06 PROCEDURE — 80048 BASIC METABOLIC PNL TOTAL CA: CPT | Performed by: EMERGENCY MEDICINE

## 2024-07-06 PROCEDURE — 85025 COMPLETE CBC W/AUTO DIFF WBC: CPT | Performed by: EMERGENCY MEDICINE

## 2024-07-06 PROCEDURE — 99284 EMERGENCY DEPT VISIT MOD MDM: CPT | Performed by: EMERGENCY MEDICINE

## 2024-07-06 PROCEDURE — 36415 COLL VENOUS BLD VENIPUNCTURE: CPT | Performed by: EMERGENCY MEDICINE

## 2024-07-06 PROCEDURE — 87086 URINE CULTURE/COLONY COUNT: CPT | Performed by: EMERGENCY MEDICINE

## 2024-07-06 PROCEDURE — 99284 EMERGENCY DEPT VISIT MOD MDM: CPT

## 2024-07-06 RX ORDER — PHENAZOPYRIDINE HYDROCHLORIDE 100 MG/1
100 TABLET, FILM COATED ORAL ONCE
Status: COMPLETED | OUTPATIENT
Start: 2024-07-06 | End: 2024-07-06

## 2024-07-06 RX ORDER — NAPROXEN 500 MG/1
500 TABLET ORAL ONCE
Status: COMPLETED | OUTPATIENT
Start: 2024-07-06 | End: 2024-07-06

## 2024-07-06 RX ORDER — PHENAZOPYRIDINE HYDROCHLORIDE 200 MG/1
200 TABLET, FILM COATED ORAL 3 TIMES DAILY
Qty: 6 TABLET | Refills: 0 | Status: SHIPPED | OUTPATIENT
Start: 2024-07-06

## 2024-07-06 RX ADMIN — NAPROXEN 500 MG: 500 TABLET ORAL at 23:58

## 2024-07-06 RX ADMIN — PHENAZOPYRIDINE 100 MG: 100 TABLET ORAL at 23:58

## 2024-07-07 ENCOUNTER — APPOINTMENT (EMERGENCY)
Dept: RADIOLOGY | Facility: HOSPITAL | Age: 64
End: 2024-07-07
Payer: COMMERCIAL

## 2024-07-07 LAB
ANION GAP SERPL CALCULATED.3IONS-SCNC: 8 MMOL/L (ref 4–13)
BUN SERPL-MCNC: 11 MG/DL (ref 5–25)
CALCIUM SERPL-MCNC: 9.3 MG/DL (ref 8.4–10.2)
CHLORIDE SERPL-SCNC: 106 MMOL/L (ref 96–108)
CO2 SERPL-SCNC: 25 MMOL/L (ref 21–32)
CREAT SERPL-MCNC: 1.23 MG/DL (ref 0.6–1.3)
GFR SERPL CREATININE-BSD FRML MDRD: 61 ML/MIN/1.73SQ M
GLUCOSE SERPL-MCNC: 116 MG/DL (ref 65–140)
POTASSIUM SERPL-SCNC: 4 MMOL/L (ref 3.5–5.3)
SODIUM SERPL-SCNC: 139 MMOL/L (ref 135–147)

## 2024-07-07 PROCEDURE — 74177 CT ABD & PELVIS W/CONTRAST: CPT

## 2024-07-07 RX ADMIN — IOHEXOL 100 ML: 350 INJECTION, SOLUTION INTRAVENOUS at 00:44

## 2024-07-07 NOTE — ED PROVIDER NOTES
Final Diagnosis:  1. Interstitial cystitis    2. Prostate enlargement        Chief Complaint   Patient presents with    Difficulty Urinating     Started this afternoon with feeling of urgency and unable to get a good urine stream out. No pain, no blood       HPI  Pt pres w/ sensation he has to urinate over and over. Doesn't have a dr and doesn't take any meds. Was preev on eloquis for dvt.     Has burning urination. Urinary freq. No blood. No n/v. No abd pain. No fever/chills.     Normal BM    EMS additionally reports:     - Previous charting underwent limited review with attention to last ED visits, labs, ekgs, and prior imaging.  Chart review reveals :     Admission on 09/06/2023, Discharged on 09/06/2023   Component Date Value Ref Range Status    WBC 09/06/2023 6.54  4.31 - 10.16 Thousand/uL Final    RBC 09/06/2023 5.22  3.88 - 5.62 Million/uL Final    Hemoglobin 09/06/2023 15.7  12.0 - 17.0 g/dL Final    Hematocrit 09/06/2023 47.9  36.5 - 49.3 % Final    MCV 09/06/2023 92  82 - 98 fL Final    MCH 09/06/2023 30.1  26.8 - 34.3 pg Final    MCHC 09/06/2023 32.8  31.4 - 37.4 g/dL Final    RDW 09/06/2023 13.0  11.6 - 15.1 % Final    MPV 09/06/2023 10.8  8.9 - 12.7 fL Final    Platelets 09/06/2023 153  149 - 390 Thousands/uL Final    nRBC 09/06/2023 0  /100 WBCs Final    Segmented % 09/06/2023 64  43 - 75 % Final    Immature Grans % 09/06/2023 1  0 - 2 % Final    Lymphocytes % 09/06/2023 24  14 - 44 % Final    Monocytes % 09/06/2023 9  4 - 12 % Final    Eosinophils Relative 09/06/2023 1  0 - 6 % Final    Basophils Relative 09/06/2023 1  0 - 1 % Final    Absolute Neutrophils 09/06/2023 4.23  1.85 - 7.62 Thousands/µL Final    Absolute Immature Grans 09/06/2023 0.05  0.00 - 0.20 Thousand/uL Final    Absolute Lymphocytes 09/06/2023 1.55  0.60 - 4.47 Thousands/µL Final    Absolute Monocytes 09/06/2023 0.60  0.17 - 1.22 Thousand/µL Final    Eosinophils Absolute 09/06/2023 0.06  0.00 - 0.61 Thousand/µL Final    Basophils  Absolute 09/06/2023 0.05  0.00 - 0.10 Thousands/µL Final    Sodium 09/06/2023 140  135 - 147 mmol/L Final    Potassium 09/06/2023 4.5  3.5 - 5.3 mmol/L Final    Chloride 09/06/2023 107  96 - 108 mmol/L Final    CO2 09/06/2023 28  21 - 32 mmol/L Final    ANION GAP 09/06/2023 5  mmol/L Final    BUN 09/06/2023 16  5 - 25 mg/dL Final    Creatinine 09/06/2023 1.16  0.60 - 1.30 mg/dL Final    Standardized to IDMS reference method    Glucose 09/06/2023 119  65 - 140 mg/dL Final    If the patient is fasting, the ADA then defines impaired fasting glucose as > 100 mg/dL and diabetes as > or equal to 123 mg/dL.    Calcium 09/06/2023 9.3  8.4 - 10.2 mg/dL Final    AST 09/06/2023 16  13 - 39 U/L Final    ALT 09/06/2023 16  7 - 52 U/L Final    Specimen collection should occur prior to Sulfasalazine administration due to the potential for falsely depressed results.     Alkaline Phosphatase 09/06/2023 96  34 - 104 U/L Final    Total Protein 09/06/2023 7.3  6.4 - 8.4 g/dL Final    Albumin 09/06/2023 4.2  3.5 - 5.0 g/dL Final    Total Bilirubin 09/06/2023 0.53  0.20 - 1.00 mg/dL Final    Use of this assay is not recommended for patients undergoing treatment with eltrombopag due to the potential for falsely elevated results.  N-acetyl-p-benzoquinone imine (metabolite of Acetaminophen) will generate erroneously low results in samples for patients that have taken an overdose of Acetaminophen.    eGFR 09/06/2023 66  ml/min/1.73sq m Final    D-Dimer, Quant 09/06/2023 0.98 (H)  <0.50 ug/ml FEU Final    Reference and upper limits to exclude DVT and PE are the same.  Do not use to exclude if clinical symptoms are present.    SARS-CoV-2 09/06/2023 Negative  Negative Final    INFLUENZA A PCR 09/06/2023 Negative  Negative Final    INFLUENZA B PCR 09/06/2023 Negative  Negative Final    RSV PCR 09/06/2023 Negative  Negative Final    Ventricular Rate 09/06/2023 80  BPM Final    Atrial Rate 09/06/2023 80  BPM Final    WV Interval 09/06/2023 208  ms  Final    QRSD Interval 09/06/2023 88  ms Final    QT Interval 09/06/2023 378  ms Final    QTC Interval 09/06/2023 435  ms Final    P Axis 09/06/2023 53  degrees Final    QRS [place] 09/06/2023 65  degrees Final    T Wave Axis 09/06/2023 15  degrees Final       - No language barrier.   - History obtained from patient    - Discuss patient's care, with patient permission or by chart review, with      PMH:   has a past medical history of DVT of lower limb, acute (HCC), Pneumonia, and Rheumatic fever.    PSH:   has a past surgical history that includes Tonsillectomy.     Social History:  Tobacco Use: Low Risk  (7/6/2024)    Patient History     Smoking Tobacco Use: Never     Smokeless Tobacco Use: Never     Passive Exposure: Not on file     Alcohol Use: Not At Risk (9/1/2019)    AUDIT-C     Frequency of Alcohol Consumption: Never     Average Number of Drinks: Not on file     Frequency of Binge Drinking: Not on file     No illicit use       ROS:  Pertinent positives/negatives: .     Some ROS may be present in the HPI and would take precedent over these standard questions asked below.   Review of Systems   Constitutional:  Negative for chills and fever.   Gastrointestinal:  Negative for nausea and vomiting.   Genitourinary:  Positive for dysuria, frequency and urgency. Negative for decreased urine volume, flank pain, hematuria, penile discharge, penile pain and testicular pain.        CONSTITUTIONAL:  No lethargy. No unexpected weight loss. No change in behavior.  EYES:  No pain, redness, or discharge. No loss of vision. No orbital trauma or pain.   ENT:  No tinnitus or decreased hearing. No epistaxis/purulent rhinorrhea. No voice change, airway closing, trismus.   CARDIOVASCULAR:  No chest pain. No skin mottling or pallor. No change in exertional capacity  RESPIRATORY:  No hemoptysis. No paroxysmal nocturnal dyspnea. No stridor. No apnea or bluing.   GASTROINTESTINAL:  No vomiting, diarrhea. No distension. No melena. No  hematochezia.   GENITOURINARY:  No nocturia. No hematuria or foul smelling or cloudy urine. No discharge. No sores/adenopathy.   MUSCULOSKELETAL:  No contracture.  No new deformity.   INTEGUMENTARY:  No swelling. No unexpected contusions. No abrasions. No lymphangitis.  NEUROLOGIC:  No meningismus. No new numbness of the extremities. No new focal weakness. No postural instability  PSYCHIATRIC:  No SI HI AVH  HEMATOLOGICAL:  No bleeding. No petechiae. No bruising.  ALLERGIES:  No urticaria. No sudden abd cramping. No stridor.    PE:     Physical exam highlights:   Physical Exam       Vitals:    07/06/24 2236   BP: (!) 180/84   BP Location: Right arm   Pulse: 77   Resp: 20   Temp: 98.4 °F (36.9 °C)   TempSrc: Tympanic   SpO2: 97%   Weight: 116 kg (256 lb)     Vitals reviewed by me.   Nursing note reviewed  Chaperone present for all sensitive exam.  Const: No acute distress. Alert. Nontoxic. Not diaphoretic.    HEENT: External ears normal. No protrusion drainage swelling. Nose normal. No drainage/traumatic deformity. MM. Mouth with baseline/symmetric movement. No trismus.   Eyes: No squinting. No icterus. No tearing/swelling/drainage. Tracks through the room with normal EOM.   Neck: ROM normal. No rigidity. No meningismus.  Cards: Rate as per vitals Compared to monitor sinus unless documented. Regular Well perfused.  Pulm: Effort and excursion normal. No distress. No audible wheezing/no stridor. Normal resp rate without retraction or change in work of breathing.  Abd: No distension beyond baseline. No fluctuant wave. Patient without peritoneal pain with shifting/bumping the bed. Soft no cva tenderness  MSK: ROM normal baseline. No deformity. No contractures from baseline.   Skin: No new rashes visible. Well perfused. No wounds visualized on exposed skin  Neuro: Nonfocal. Baseline. CN grossly intact. Moving all four with coordination.   Psych: Normal behavior and affect.        A:  - Nursing note reviewed.    Ddx and  MDM  Considered diagnoses    UTI?  None  Send for culture    Urinary retention?  > 200 on bladder scan    I look w/ US to confirm  Has mass vs prostate nodule pressing into baldder    Thus get CT r/o hydro  None  Check renal fxn  Normal    CT eval for bladder mass vs large prostate    Amb referral for uro and PCP for PSA , TURP consideration, etc           Dispo decision       My conversation with consultant reveals:        Decision rules:                           My read of the XR/CT scan reveals:     CT abdomen pelvis with contrast   Final Result      1.  Enlarged prostate with indentation of the inferior bladder similar to prior examination which may be due to benign prostatic hyperplasia. If there is clinical concern for prostate cancer, urology consultation is recommended. Correlate with PSA and    further evaluation with prostate MRI may be obtained.   2.  3 mm nodules in the right middle lobe. Based on current Fleischner Society 2017 Guidelines on incidental pulmonary nodule, no routine follow-up is needed if the patient is low risk. If the patient is high risk, optional follow-up chest CT at 12    months can be considered.   3.  Small bilateral hydroceles.         Workstation performed: [id number]             Orders Placed This Encounter   Procedures    Urine culture    CT abdomen pelvis with contrast    UA (URINE) with reflex to Scope    CBC and differential    Basic metabolic panel    Ambulatory Referral to Family Practice    Ambulatory Referral to Urology     Labs Reviewed   URINALYSIS WITH REFLEX TO SCOPE - Abnormal       Result Value Ref Range Status    Color, UA Colorless   Final    Clarity, UA Clear   Final    Specific Gravity, UA <1.005 (*) 1.005 - 1.030 Final    pH, UA 5.0  4.5, 5.0, 5.5, 6.0, 6.5, 7.0, 7.5, 8.0 Final    Leukocytes, UA Negative  Negative Final    Nitrite, UA Negative  Negative Final    Protein, UA Negative  Negative mg/dl Final    Glucose, UA Negative  Negative mg/dl Final     Ketones, UA Negative  Negative mg/dl Final    Urobilinogen, UA <2.0  <2.0 mg/dl mg/dl Final    Bilirubin, UA Negative  Negative Final    Occult Blood, UA Negative  Negative Final   URINE CULTURE   CBC AND DIFFERENTIAL    WBC 8.64  4.31 - 10.16 Thousand/uL Final    RBC 4.97  3.88 - 5.62 Million/uL Final    Hemoglobin 14.6  12.0 - 17.0 g/dL Final    Hematocrit 44.6  36.5 - 49.3 % Final    MCV 90  82 - 98 fL Final    MCH 29.4  26.8 - 34.3 pg Final    MCHC 32.7  31.4 - 37.4 g/dL Final    RDW 13.1  11.6 - 15.1 % Final    MPV 10.1  8.9 - 12.7 fL Final    Platelets 261  149 - 390 Thousands/uL Final    nRBC 0  /100 WBCs Final    Segmented % 73  43 - 75 % Final    Immature Grans % 1  0 - 2 % Final    Lymphocytes % 16  14 - 44 % Final    Monocytes % 8  4 - 12 % Final    Eosinophils Relative 1  0 - 6 % Final    Basophils Relative 1  0 - 1 % Final    Absolute Neutrophils 6.37  1.85 - 7.62 Thousands/µL Final    Absolute Immature Grans 0.06  0.00 - 0.20 Thousand/uL Final    Absolute Lymphocytes 1.39  0.60 - 4.47 Thousands/µL Final    Absolute Monocytes 0.68  0.17 - 1.22 Thousand/µL Final    Eosinophils Absolute 0.09  0.00 - 0.61 Thousand/µL Final    Basophils Absolute 0.05  0.00 - 0.10 Thousands/µL Final   BASIC METABOLIC PANEL    Sodium 139  135 - 147 mmol/L Final    Potassium 4.0  3.5 - 5.3 mmol/L Final    Chloride 106  96 - 108 mmol/L Final    CO2 25  21 - 32 mmol/L Final    ANION GAP 8  4 - 13 mmol/L Final    BUN 11  5 - 25 mg/dL Final    Creatinine 1.23  0.60 - 1.30 mg/dL Final    Comment: Standardized to IDMS reference method    Glucose 116  65 - 140 mg/dL Final    Comment: If the patient is fasting, the ADA then defines impaired fasting glucose as > 100 mg/dL and diabetes as > or equal to 123 mg/dL.    Calcium 9.3  8.4 - 10.2 mg/dL Final    eGFR 61  ml/min/1.73sq m Final    Narrative:     National Kidney Disease Foundation guidelines for Chronic Kidney Disease (CKD):     Stage 1 with normal or high GFR (GFR > 90  mL/min/1.73 square meters)    Stage 2 Mild CKD (GFR = 60-89 mL/min/1.73 square meters)    Stage 3A Moderate CKD (GFR = 45-59 mL/min/1.73 square meters)    Stage 3B Moderate CKD (GFR = 30-44 mL/min/1.73 square meters)    Stage 4 Severe CKD (GFR = 15-29 mL/min/1.73 square meters)    Stage 5 End Stage CKD (GFR <15 mL/min/1.73 square meters)  Note: GFR calculation is accurate only with a steady state creatinine       *Each of these labs was reviewed. Particular standout labs will be noted in the ED Course above     Final Diagnosis:  1. Interstitial cystitis    2. Prostate enlargement          P:  - hospital tx includes   Medications   phenazopyridine (PYRIDIUM) tablet 100 mg (100 mg Oral Given 7/6/24 2358)   naproxen (NAPROSYN) tablet 500 mg (500 mg Oral Given 7/6/24 2358)   iohexol (OMNIPAQUE) 350 MG/ML injection (MULTI-DOSE) 100 mL (100 mL Intravenous Given 7/7/24 0044)         - disposition  Time reflects when diagnosis was documented in both MDM as applicable and the Disposition within this note       Time User Action Codes Description Comment    7/6/2024 11:17 PM Eduardo Hackett [N30.10] Interstitial cystitis     7/7/2024  1:40 AM Eduardo Hackett [N40.0] Prostate enlargement           ED Disposition       ED Disposition   Discharge    Condition   Stable    Date/Time   Sat Jul 6, 2024 11:17 PM    Comment   Anson Kimball discharge to home/self care.                   Follow-up Information       Follow up With Specialties Details Why Contact Info Additional Information    [facility]   [address]  [facility] [id number] [phone] [facility], [address] [place] [address] [address], [place], [place], [id number] [phone]    [facility]   [address]  [facility] [address]  [phone] [facility] [address], [facility] [address], [place], [place],  [address], [phone]            - patient will call their PCP to let them know they were in the emergency department. We discuss return precautions and patient is agreeable with plan and aformentioned disposition.       - additional treatment intended, if consistent with primary provider:  - patient to follow with :      Discharge Medication List as of 7/7/2024  1:40 AM        START taking these medications    Details   phenazopyridine (PYRIDIUM) 200 mg tablet Take 1 tablet (200 mg total) by mouth 3 (three) times a day, Starting Sat 7/6/2024, Normal           CONTINUE these medications which have NOT CHANGED    Details   aspirin 81 mg chewable tablet Chew 81 mg daily, Historical Med      apixaban (ELIQUIS) 5 mg Take 20 mg by mouth 2 (two) times a day, Historical Med      hydrOXYzine HCL (ATARAX) 25 mg tablet Take 2 tablets (50 mg total) by mouth every 6 (six) hours as needed for itching, Starting Fri 1/28/2022, Normal      loratadine (CLARITIN) 10 mg tablet Take 1 tablet (10 mg total) by mouth daily, Starting Mon 7/10/2023, Normal      predniSONE 10 mg tablet 5 po daily x2 days, then 4 po daily x2 days, then 3 po daily x2 days, then 2 po daily x2 days,then 1 po daily x2days, Normal      tamsulosin (FLOMAX) 0.4 mg Take 1 capsule (0.4 mg total) by mouth daily with dinner, Starting Mon 5/2/2022, Normal             Prior to Admission Medications   Prescriptions Last Dose Informant Patient Reported? Taking?   apixaban (ELIQUIS) 5 mg   Yes No   Sig: Take 20 mg by mouth 2 (two) times a day   Patient not taking: Reported on 11/3/2021   aspirin 81 mg chewable tablet 7/6/2024  Yes Yes   Sig: Chew 81 mg daily   hydrOXYzine HCL (ATARAX) 25 mg tablet   No No   Sig: Take 2 tablets (50 mg total) by mouth every 6 (six) hours as needed for itching   Patient not taking: Reported on 5/1/2022    loratadine (CLARITIN) 10 mg tablet Not Taking  No No   Sig: Take 1 tablet (10 mg total) by mouth daily   Patient not taking: Reported on  "2024   predniSONE 10 mg tablet   No No   Si po daily x2 days, then 4 po daily x2 days, then 3 po daily x2 days, then 2 po daily x2 days,then 1 po daily x2days   Patient not taking: Reported on 2022    tamsulosin (FLOMAX) 0.4 mg Not Taking  No No   Sig: Take 1 capsule (0.4 mg total) by mouth daily with dinner   Patient not taking: Reported on 2024      Facility-Administered Medications: None       Portions of the record may have been created with voice recognition software. Occasional wrong word or \"sound a like\" substitutions may have occurred due to the inherent limitations of voice recognition software. Read the chart carefully and recognize, using context, where substitutions have occurred.    Electronically signed by:  MD Eduardo Tee MD  24 [phone]    "

## 2024-07-08 LAB — BACTERIA UR CULT: NORMAL

## 2024-11-06 ENCOUNTER — HOSPITAL ENCOUNTER (EMERGENCY)
Facility: HOSPITAL | Age: 64
Discharge: HOME | End: 2024-11-06
Attending: STUDENT IN AN ORGANIZED HEALTH CARE EDUCATION/TRAINING PROGRAM
Payer: COMMERCIAL

## 2024-11-06 ENCOUNTER — APPOINTMENT (EMERGENCY)
Dept: RADIOLOGY | Facility: HOSPITAL | Age: 64
End: 2024-11-06
Payer: COMMERCIAL

## 2024-11-06 VITALS
RESPIRATION RATE: 20 BRPM | OXYGEN SATURATION: 96 % | HEART RATE: 102 BPM | TEMPERATURE: 98.9 F | DIASTOLIC BLOOD PRESSURE: 72 MMHG | SYSTOLIC BLOOD PRESSURE: 161 MMHG

## 2024-11-06 DIAGNOSIS — S93.409A ANKLE SPRAIN: Primary | ICD-10-CM

## 2024-11-06 PROCEDURE — 99283 EMERGENCY DEPT VISIT LOW MDM: CPT

## 2024-11-06 PROCEDURE — 99284 EMERGENCY DEPT VISIT MOD MDM: CPT | Performed by: STUDENT IN AN ORGANIZED HEALTH CARE EDUCATION/TRAINING PROGRAM

## 2024-11-06 PROCEDURE — 73610 X-RAY EXAM OF ANKLE: CPT

## 2024-11-06 RX ORDER — TAMSULOSIN HYDROCHLORIDE 0.4 MG/1
0.4 CAPSULE ORAL 2 TIMES DAILY
COMMUNITY

## 2024-11-06 NOTE — DISCHARGE INSTRUCTIONS
You have been evaluated in the Emergency Department today for ankle pain. Your evaluation did not find evidence of medical conditions requiring emergent intervention at this time.     Please rest, ice, and elevate your leg, and resume normal activities as tolerated.    You may take ibuprofen every 6 hours or tylenol every 6 hours as needed for pain.    Please schedule an appointment for follow up with your primary care physician this week. IF you have persistent pain you should also follow up with orthopedics. Call to schedule an appointment.     Return to the Emergency Department if you experience worsening pain, numbness, tingling, change of color in your toes, or any other concerning symptoms.

## 2024-11-06 NOTE — ED PROVIDER NOTES
Time reflects when diagnosis was documented in both MDM as applicable and the Disposition within this note       Time User Action Codes Description Comment    11/6/2024  3:32 PM Bret Pablo Add [S93.409A] Ankle sprain           ED Disposition       ED Disposition   Discharge    Condition   Stable    Date/Time   Wed Nov 6, 2024  3:32 PM    Comment   Anson Kimball discharge to home/self care.                   Assessment & Plan       Medical Decision Making  Patient is a 64 y.o. male who presents to the ED for ankle pain.  Patient is nontoxic, well-appearing.     Differential includes but is not limited to: Sprain/strain, fracture.  Presentation not consistent with dislocation.    Plan: X-rays obtained which not show any acute osseous abnormality.  Discussed with patient.  Will discharge.  Aircast given.  Return precautions discussed.  Patient verbalized understanding and agreed to plan of care.                   Amount and/or Complexity of Data Reviewed  Radiology: ordered and independent interpretation performed.             Medications - No data to display    ED Risk Strat Scores                           SBIRT 22yo+      Flowsheet Row Most Recent Value   Initial Alcohol Screen: US AUDIT-C     1. How often do you have a drink containing alcohol? 0 Filed at: 11/06/2024 1503   2. How many drinks containing alcohol do you have on a typical day you are drinking?  0 Filed at: 11/06/2024 1503   3a. Male UNDER 65: How often do you have five or more drinks on one occasion? 0 Filed at: 11/06/2024 1503   3b. FEMALE Any Age, or MALE 65+: How often do you have 4 or more drinks on one occassion? 0 Filed at: 11/06/2024 1503   Audit-C Score 0 Filed at: 11/06/2024 1503   ADRIANO: How many times in the past year have you...    Used an illegal drug or used a prescription medication for non-medical reasons? Never Filed at: 11/06/2024 1503                            History of Present Illness       Chief Complaint   Patient  presents with    Ankle Injury     States fell about 3 feet off ladder onto R ankle       Past Medical History:   Diagnosis Date    DVT of lower limb, acute (HCC)     Pneumonia     Rheumatic fever       Past Surgical History:   Procedure Laterality Date    TONSILLECTOMY        History reviewed. No pertinent family history.   Social History     Tobacco Use    Smoking status: Never    Smokeless tobacco: Never   Vaping Use    Vaping status: Never Used   Substance Use Topics    Alcohol use: Never    Drug use: Never      E-Cigarette/Vaping    E-Cigarette Use Never User       E-Cigarette/Vaping Substances    Nicotine No     THC No     CBD No     Flavoring No     Other No     Unknown No       I have reviewed and agree with the history as documented.     Patient is a 64-year-old male, no pertinent past medical history, who presents to the emergency room for right ankle pain.  Patient was coming down a ladder when he missed a step.  States he fell about 3 steps and inverted his right ankle.  Denies any other injuries.  Since then has been able to walk but notes pain while doing so.  No other modifying factors.  No associated symptoms.  No numbness, tingling, weakness.  No other complaints or concerns.      Ankle Injury      Review of Systems   Musculoskeletal:  Negative for gait problem.        R ankle pain     All other systems reviewed and are negative.          Objective       ED Triage Vitals [11/06/24 1445]   Temperature Pulse Blood Pressure Respirations SpO2 Patient Position - Orthostatic VS   98.9 °F (37.2 °C) 102 161/72 20 96 % Sitting      Temp Source Heart Rate Source BP Location FiO2 (%) Pain Score    Tympanic Monitor Right arm -- 2      Vitals      Date and Time Temp Pulse SpO2 Resp BP Pain Score FACES Pain Rating User   11/06/24 1445 98.9 °F (37.2 °C) 102 96 % 20 161/72 2 -- LS            Physical Exam  Vitals and nursing note reviewed.   Constitutional:       General: He is not in acute distress.     Appearance:  He is well-developed. He is not ill-appearing, toxic-appearing or diaphoretic.   HENT:      Head: Normocephalic and atraumatic.      Right Ear: External ear normal.      Left Ear: External ear normal.      Nose: Nose normal.   Eyes:      General: Lids are normal. No scleral icterus.  Cardiovascular:      Rate and Rhythm: Normal rate and regular rhythm.   Pulmonary:      Effort: Pulmonary effort is normal. No respiratory distress.   Musculoskeletal:         General: No deformity. Normal range of motion.      Cervical back: Normal range of motion and neck supple.        Feet:       Comments: 2+ DP and PT pulse.  No  swelling.  No crepitus.  No overlying skin changes.  No deformities.   Skin:     General: Skin is warm and dry.   Neurological:      General: No focal deficit present.      Mental Status: He is alert.   Psychiatric:         Mood and Affect: Mood normal.         Behavior: Behavior normal.         Results Reviewed       None            XR ankle 3+ views RIGHT   ED Interpretation by Bret Pablo DO ([id number])   No acute osseous abnormality          Procedures    ED Medication and Procedure Management   Prior to Admission Medications   Prescriptions Last Dose Informant Patient Reported? Taking?   apixaban (ELIQUIS) 5 mg   Yes No   Sig: Take 20 mg by mouth 2 (two) times a day   Patient not taking: Reported on 11/3/2021   aspirin 81 mg chewable tablet   Yes No   Sig: Chew 81 mg daily   hydrOXYzine HCL (ATARAX) 25 mg tablet   No No   Sig: Take 2 tablets (50 mg total) by mouth every 6 (six) hours as needed for itching   Patient not taking: Reported on 2022    loratadine (CLARITIN) 10 mg tablet   No No   Sig: Take 1 tablet (10 mg total) by mouth daily   Patient not taking: Reported on 2024   phenazopyridine (PYRIDIUM) 200 mg tablet Not Taking  No No   Sig: Take 1 tablet (200 mg total) by mouth 3 (three) times a day   Patient not taking: Reported on 2024   predniSONE 10 mg tablet   No No   Si  po daily x2 days, then 4 po daily x2 days, then 3 po daily x2 days, then 2 po daily x2 days,then 1 po daily x2days   Patient not taking: Reported on 5/1/2022    tamsulosin (FLOMAX) 0.4 mg   No No   Sig: Take 1 capsule (0.4 mg total) by mouth daily with dinner   Patient not taking: Reported on 7/6/2024   tamsulosin (Flomax) 0.4 mg   Yes Yes   Sig: Take 0.4 mg by mouth 2 (two) times a day      Facility-Administered Medications: None     Discharge Medication List as of 11/6/2024  3:33 PM        CONTINUE these medications which have NOT CHANGED    Details   !! tamsulosin (Flomax) 0.4 mg Take 0.4 mg by mouth 2 (two) times a day, Historical Med      apixaban (ELIQUIS) 5 mg Take 20 mg by mouth 2 (two) times a day, Historical Med      aspirin 81 mg chewable tablet Chew 81 mg daily, Historical Med      hydrOXYzine HCL (ATARAX) 25 mg tablet Take 2 tablets (50 mg total) by mouth every 6 (six) hours as needed for itching, Starting Fri 1/28/2022, Normal      loratadine (CLARITIN) 10 mg tablet Take 1 tablet (10 mg total) by mouth daily, Starting Mon 7/10/2023, Normal      phenazopyridine (PYRIDIUM) 200 mg tablet Take 1 tablet (200 mg total) by mouth 3 (three) times a day, Starting Sat 7/6/2024, Normal      predniSONE 10 mg tablet 5 po daily x2 days, then 4 po daily x2 days, then 3 po daily x2 days, then 2 po daily x2 days,then 1 po daily x2days, Normal      !! tamsulosin (FLOMAX) 0.4 mg Take 1 capsule (0.4 mg total) by mouth daily with dinner, Starting Mon 5/2/2022, Normal       !! - Potential duplicate medications found. Please discuss with provider.        No discharge procedures on file.  ED SEPSIS DOCUMENTATION   Time reflects when diagnosis was documented in both MDM as applicable and the Disposition within this note       Time User Action Codes Description Comment    11/6/2024  3:32 PM Bret Pablo Add [S93.409A] Ankle sprain                  Bret Pablo,   11/06/24 1639

## 2024-11-23 ENCOUNTER — HOSPITAL ENCOUNTER (EMERGENCY)
Facility: HOSPITAL | Age: 64
Discharge: HOME | End: 2024-11-23
Attending: EMERGENCY MEDICINE | Admitting: EMERGENCY MEDICINE
Payer: COMMERCIAL

## 2024-11-23 VITALS
TEMPERATURE: 98 F | OXYGEN SATURATION: 98 % | DIASTOLIC BLOOD PRESSURE: 63 MMHG | HEART RATE: 86 BPM | RESPIRATION RATE: 18 BRPM | SYSTOLIC BLOOD PRESSURE: 163 MMHG

## 2024-11-23 DIAGNOSIS — S05.00XA CORNEAL ABRASION: ICD-10-CM

## 2024-11-23 DIAGNOSIS — T15.91XA FOREIGN BODY, EYE, RIGHT, INITIAL ENCOUNTER: Primary | ICD-10-CM

## 2024-11-23 PROCEDURE — 65220 REMOVE FOREIGN BODY FROM EYE: CPT | Performed by: EMERGENCY MEDICINE

## 2024-11-23 PROCEDURE — 99284 EMERGENCY DEPT VISIT MOD MDM: CPT | Performed by: EMERGENCY MEDICINE

## 2024-11-23 PROCEDURE — 99283 EMERGENCY DEPT VISIT LOW MDM: CPT

## 2024-11-23 RX ORDER — KETOROLAC TROMETHAMINE 5 MG/ML
1 SOLUTION OPHTHALMIC 4 TIMES DAILY
Qty: 5 ML | Refills: 0 | Status: SHIPPED | OUTPATIENT
Start: 2024-11-23

## 2024-11-23 RX ORDER — CIPROFLOXACIN HYDROCHLORIDE 3.5 MG/ML
1 SOLUTION/ DROPS TOPICAL
Qty: 5 ML | Refills: 0 | Status: SHIPPED | OUTPATIENT
Start: 2024-11-23

## 2024-11-23 RX ORDER — KETOROLAC TROMETHAMINE 5 MG/ML
1 SOLUTION OPHTHALMIC ONCE
Status: COMPLETED | OUTPATIENT
Start: 2024-11-23 | End: 2024-11-23

## 2024-11-23 RX ORDER — CIPROFLOXACIN HYDROCHLORIDE 3.5 MG/ML
1 SOLUTION/ DROPS TOPICAL ONCE
Status: COMPLETED | OUTPATIENT
Start: 2024-11-23 | End: 2024-11-23

## 2024-11-23 RX ORDER — TETRACAINE HYDROCHLORIDE 5 MG/ML
2 SOLUTION OPHTHALMIC ONCE
Status: COMPLETED | OUTPATIENT
Start: 2024-11-23 | End: 2024-11-23

## 2024-11-23 RX ADMIN — FLUORESCEIN SODIUM 1 STRIP: 1 STRIP OPHTHALMIC at 07:36

## 2024-11-23 RX ADMIN — KETOROLAC TROMETHAMINE 1 DROP: 5 SOLUTION OPHTHALMIC at 07:58

## 2024-11-23 RX ADMIN — TETRACAINE HYDROCHLORIDE 2 DROP: 5 SOLUTION OPHTHALMIC at 07:36

## 2024-11-23 RX ADMIN — CIPROFLOXACIN HYDROCHLORIDE 1 DROP: 3 SOLUTION/ DROPS OPHTHALMIC at 07:58

## 2024-11-23 NOTE — ED PROVIDER NOTES
Time reflects when diagnosis was documented in both MDM as applicable and the Disposition within this note       Time User Action Codes Description Comment    11/23/2024  7:57 AM AvilezYonas van Add [T15.91XA] Foreign body, eye, right, initial encounter     11/23/2024  7:57 AM AvilezYonas van Add [S05.00XA] Corneal abrasion           ED Disposition       ED Disposition   Discharge    Condition   Stable    Date/Time   Sat Nov 23, 2024  7:57 AM    Comment   Anson Kimball discharge to home/self care.                   Assessment & Plan       Medical Decision Making  Foreign body sensation with a visible foreign body present.  Was able to be removed.  Will treat with antibiotics and Acular    Risk  Prescription drug management.             Medications   tetracaine 0.5 % ophthalmic solution 2 drop (2 drops Right Eye Given 11/23/24 0736)   fluorescein sodium sterile ophthalmic strip 1 strip (1 strip Right Eye Given 11/23/24 0736)   ketorolac (ACULAR) 0.5 % ophthalmic solution 1 drop (1 drop Right Eye Given 11/23/24 0758)   ciprofloxacin (CILOXAN) 0.3 % ophthalmic solution 1 drop (1 drop Right Eye Given 11/23/24 0758)       ED Risk Strat Scores                                               History of Present Illness       Chief Complaint   Patient presents with    Foreign Body in Eye     Pt started with r eye irritation started Thursday feel like something is in his right eye.       Past Medical History:   Diagnosis Date    DVT of lower limb, acute (HCC)     Pneumonia     Rheumatic fever       Past Surgical History:   Procedure Laterality Date    TONSILLECTOMY        No family history on file.   Social History     Tobacco Use    Smoking status: Never    Smokeless tobacco: Never   Vaping Use    Vaping status: Never Used   Substance Use Topics    Alcohol use: Never    Drug use: Never      E-Cigarette/Vaping    E-Cigarette Use Never User       E-Cigarette/Vaping Substances    Nicotine No     THC No     CBD No      Flavoring No     Other No     Unknown No       I have reviewed and agree with the history as documented.     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  He had a foreign body sensation in the right eye starting on Thursday.  Patient is not sure what went in the eye but likely a piece of dirt or debris.  There was no vision changes.  States has been having increased tearing and eye mucus in the morning.  Patient tried to rub it out and flush it out himself without success.  No fever.  No other complaints        Review of Systems   Constitutional:  Negative for fever.   HENT:  Negative for congestion.    Eyes:  Positive for photophobia, pain, discharge and redness. Negative for itching and visual disturbance.   Respiratory:  Negative for shortness of breath.    Cardiovascular:  Negative for chest pain.   Gastrointestinal:  Negative for abdominal pain and vomiting.   Genitourinary:  Negative for difficulty urinating.   Skin:  Negative for rash.   Neurological:  Negative for weakness and headaches.   Hematological:  Does not bruise/bleed easily.   Psychiatric/Behavioral:  Negative for confusion.    All other systems reviewed and are negative.          Objective       ED Triage Vitals [11/23/24 0731]   Temperature Pulse Blood Pressure Respirations SpO2 Patient Position - Orthostatic VS   98 °F (36.7 °C) 86 163/63 18 98 % Sitting      Temp src Heart Rate Source BP Location FiO2 (%) Pain Score    -- -- Right arm -- --      Vitals      Date and Time Temp Pulse SpO2 Resp BP Pain Score FACES Pain Rating User   11/23/24 0731 98 °F (36.7 °C) 86 98 % 18 163/63 -- -- CLAY            Physical Exam  Vitals and nursing note reviewed.   Constitutional:       Appearance: Normal appearance.   HENT:      Head: Normocephalic.      Right Ear: External ear normal.      Nose: Nose normal.      Mouth/Throat:      Mouth: Mucous membranes are moist.   Eyes:      Extraocular Movements: Extraocular movements intact.      Pupils:  Pupils are equal, round, and reactive to light.      Comments: Right eye  is erythematous, injected.  There is a small round foreign body noted at the 5 o'clock position on the cornea, visible with the naked eye   Cardiovascular:      Rate and Rhythm: Normal rate.      Pulses: Normal pulses.   Pulmonary:      Effort: Pulmonary effort is normal.   Abdominal:      Palpations: Abdomen is soft.   Musculoskeletal:         General: Normal range of motion.      Cervical back: Normal range of motion.   Skin:     General: Skin is warm.      Capillary Refill: Capillary refill takes less than 2 seconds.   Neurological:      General: No focal deficit present.      Mental Status: He is alert.   Psychiatric:         Mood and Affect: Mood normal.         Results Reviewed       None            No orders to display       Foreign Body - Ocular    Date/Time: 11/23/2024 7:44 AM    Performed by: Yonas Avilez MD  Authorized by: Yonas Avilez MD    Patient location:  ED  Consent:     Consent obtained:  Verbal    Consent given by:  Patient  Universal protocol:     Patient identity confirmed:  Verbally with patient  Location:     Location:  R corneal    Depth:  Superficial  Pre-procedure details:     Imaging:  None    Fluorescein exam: yes      Fluorescein uptake: yes      Corneal abrasion description:  Punctate lesion at 5 o'clock position    Corneal abrasion location:  Medial  Anesthesia (see MAR for exact dosages):     Local anesthetic:  Tetracaine drops  Procedure details:     Localization method:  Direct visualization    Removal mechanism:  Irrigation and moist cotton swab    Foreign bodies recovered:  1    Intact foreign body removal: yes    Post-procedure details:     Confirmation:  No additional foreign bodies on visualization    Dressing:  Antibiotic drops    Patient tolerance of procedure:  Tolerated well, no immediate complications      ED Medication and Procedure Management   Prior to Admission Medications   Prescriptions  Last Dose Informant Patient Reported? Taking?   apixaban (ELIQUIS) 5 mg   Yes No   Sig: Take 20 mg by mouth 2 (two) times a day   Patient not taking: Reported on 11/3/2021   aspirin 81 mg chewable tablet   Yes No   Sig: Chew 81 mg daily   hydrOXYzine HCL (ATARAX) 25 mg tablet   No No   Sig: Take 2 tablets (50 mg total) by mouth every 6 (six) hours as needed for itching   Patient not taking: Reported on 2022    loratadine (CLARITIN) 10 mg tablet   No No   Sig: Take 1 tablet (10 mg total) by mouth daily   Patient not taking: Reported on 2024   phenazopyridine (PYRIDIUM) 200 mg tablet   No No   Sig: Take 1 tablet (200 mg total) by mouth 3 (three) times a day   Patient not taking: Reported on 2024   predniSONE 10 mg tablet   No No   Si po daily x2 days, then 4 po daily x2 days, then 3 po daily x2 days, then 2 po daily x2 days,then 1 po daily x2days   Patient not taking: Reported on 2022    tamsulosin (FLOMAX) 0.4 mg   No No   Sig: Take 1 capsule (0.4 mg total) by mouth daily with dinner   Patient not taking: Reported on 2024   tamsulosin (Flomax) 0.4 mg   Yes No   Sig: Take 0.4 mg by mouth 2 (two) times a day      Facility-Administered Medications: None     Discharge Medication List as of 2024  7:59 AM        START taking these medications    Details   ciprofloxacin (CILOXAN) 0.3 % ophthalmic solution Administer 1 drop to the right eye every 2 (two) hours, Starting Sat 2024, Normal      ketorolac (ACULAR) 0.5 % ophthalmic solution Administer 1 drop to the right eye 4 (four) times a day, Starting Sat 2024, Normal           CONTINUE these medications which have NOT CHANGED    Details   apixaban (ELIQUIS) 5 mg Take 20 mg by mouth 2 (two) times a day, Historical Med      aspirin 81 mg chewable tablet Chew 81 mg daily, Historical Med      hydrOXYzine HCL (ATARAX) 25 mg tablet Take 2 tablets (50 mg total) by mouth every 6 (six) hours as needed for itching, Starting 2022,  Normal      loratadine (CLARITIN) 10 mg tablet Take 1 tablet (10 mg total) by mouth daily, Starting Mon 7/10/2023, Normal      phenazopyridine (PYRIDIUM) 200 mg tablet Take 1 tablet (200 mg total) by mouth 3 (three) times a day, Starting Sat 7/6/2024, Normal      predniSONE 10 mg tablet 5 po daily x2 days, then 4 po daily x2 days, then 3 po daily x2 days, then 2 po daily x2 days,then 1 po daily x2days, Normal      !! tamsulosin (FLOMAX) 0.4 mg Take 1 capsule (0.4 mg total) by mouth daily with dinner, Starting Mon 5/2/2022, Normal      !! tamsulosin (Flomax) 0.4 mg Take 0.4 mg by mouth 2 (two) times a day, Historical Med       !! - Potential duplicate medications found. Please discuss with provider.        No discharge procedures on file.  ED SEPSIS DOCUMENTATION   Time reflects when diagnosis was documented in both MDM as applicable and the Disposition within this note       Time User Action Codes Description Comment    11/23/2024  7:57 AM Yonas Avilez [T15.91XA] Foreign body, eye, right, initial encounter     11/23/2024  7:57 AM Yonas Avilez [S05.00XA] Corneal abrasion                  Yonas Avilez MD  11/23/24 3064       Yonas Avilez MD  11/23/24 0923

## 2025-04-04 ENCOUNTER — HOSPITAL ENCOUNTER (EMERGENCY)
Facility: HOSPITAL | Age: 65
Discharge: HOME | End: 2025-04-04
Payer: MEDICARE

## 2025-04-04 VITALS
DIASTOLIC BLOOD PRESSURE: 74 MMHG | RESPIRATION RATE: 18 BRPM | SYSTOLIC BLOOD PRESSURE: 169 MMHG | HEART RATE: 95 BPM | OXYGEN SATURATION: 98 % | BODY MASS INDEX: 33.23 KG/M2 | TEMPERATURE: 98.3 F | WEIGHT: 245 LBS

## 2025-04-04 DIAGNOSIS — S00.259A METAL FOREIGN BODY IN EYE REGION: ICD-10-CM

## 2025-04-04 DIAGNOSIS — S05.00XA CORNEAL ABRASION: ICD-10-CM

## 2025-04-04 DIAGNOSIS — T15.91XA FOREIGN BODY IN EYE, RIGHT, INITIAL ENCOUNTER: Primary | ICD-10-CM

## 2025-04-04 PROCEDURE — 65220 REMOVE FOREIGN BODY FROM EYE: CPT

## 2025-04-04 PROCEDURE — 99284 EMERGENCY DEPT VISIT MOD MDM: CPT

## 2025-04-04 PROCEDURE — 99283 EMERGENCY DEPT VISIT LOW MDM: CPT

## 2025-04-04 RX ORDER — AMLODIPINE BESYLATE 5 MG/1
5 TABLET ORAL DAILY
COMMUNITY

## 2025-04-04 RX ORDER — ERYTHROMYCIN 5 MG/G
OINTMENT OPHTHALMIC
Qty: 50 G | Refills: 0 | Status: SHIPPED | OUTPATIENT
Start: 2025-04-04

## 2025-04-04 RX ORDER — ERYTHROMYCIN 5 MG/G
0.5 OINTMENT OPHTHALMIC ONCE
Status: COMPLETED | OUTPATIENT
Start: 2025-04-04 | End: 2025-04-04

## 2025-04-04 RX ORDER — LISINOPRIL 20 MG/1
20 TABLET ORAL DAILY
COMMUNITY

## 2025-04-04 RX ORDER — TETRACAINE HYDROCHLORIDE 5 MG/ML
1 SOLUTION OPHTHALMIC ONCE
Status: COMPLETED | OUTPATIENT
Start: 2025-04-04 | End: 2025-04-04

## 2025-04-04 RX ADMIN — FLUORESCEIN SODIUM 1 STRIP: 1 STRIP OPHTHALMIC at 00:43

## 2025-04-04 RX ADMIN — TETRACAINE HYDROCHLORIDE 1 DROP: 5 SOLUTION OPHTHALMIC at 00:43

## 2025-04-04 RX ADMIN — ERYTHROMYCIN 0.5 INCH: 5 OINTMENT OPHTHALMIC at 01:12

## 2025-04-04 NOTE — ED PROVIDER NOTES
Time reflects when diagnosis was documented in both MDM as applicable and the Disposition within this note       Time User Action Codes Description Comment    4/4/2025  1:06 AM Sky Reynoso Add [T15.91XA] Foreign body in eye, right, initial encounter     4/4/2025  1:06 AM Sky Reynoso Add [S00.259A] Metal foreign body in eye region     4/4/2025  1:08 AM Sky Reynoso Add [S05.00XA] Corneal abrasion           ED Disposition       ED Disposition   Discharge    Condition   Stable    Date/Time   Fri Apr 4, 2025  1:06 AM    Comment   Anson Kimball discharge to home/self care.                   Assessment & Plan       Medical Decision Making  Risk  Prescription drug management.      66 y/o M presents for FB sensation in R eye. Pt was grinding metal yesterday without proper eye protection and thinks a piece got in his eye. Having pain and eye redness today, feels like something is in his eye. Denies vision change. Does not wear contact lenses.   VSS  Pt has visible small metallic FB at 3 o'clock position in R cornea. Fluorescein exam with no uptake. Was able to remove the FB with small insulin needle. Will place on erythromycin ointment for presumed corneal abrasion from FB. Pt to f/u with ophthalmology for further evaluation and treatment. Stable for discharge.     ED Course as of 04/04/25 0314   Fri Apr 04, 2025   0109 Metal FB removed at bedside with 31g insulin needle, no immediate complications, no sidel sign after attempt. Pts pain improved after tetracaine        Medications   tetracaine 0.5 % ophthalmic solution 1 drop (1 drop Right Eye Given 4/4/25 0043)   fluorescein sodium sterile ophthalmic strip 1 strip (1 strip Right Eye Given 4/4/25 0043)   erythromycin (ILOTYCIN) 0.5 % ophthalmic ointment 0.5 inch (0.5 inches Right Eye Given 4/4/25 0112)       ED Risk Strat Scores                            SBIRT 20yo+      Flowsheet Row Most Recent Value   Initial Alcohol Screen: US AUDIT-C     1. How often do  you have a drink containing alcohol? 0 Filed at: 04/04/2025 0039   2. How many drinks containing alcohol do you have on a typical day you are drinking?  0 Filed at: 04/04/2025 0039   3b. FEMALE Any Age, or MALE 65+: How often do you have 4 or more drinks on one occassion? 0 Filed at: 04/04/2025 0039   Audit-C Score 0 Filed at: 04/04/2025 0039   ADRIANO: How many times in the past year have you...    Used an illegal drug or used a prescription medication for non-medical reasons? Never Filed at: 04/04/2025 0039                            History of Present Illness       Chief Complaint   Patient presents with    Eye Problem     Patient was grinding metal yesterday and thinks has a piece of metal in the right eye       Past Medical History:   Diagnosis Date    DVT of lower limb, acute (HCC)     Hypertension     Pneumonia     Rheumatic fever       Past Surgical History:   Procedure Laterality Date    TONSILLECTOMY        History reviewed. No pertinent family history.   Social History     Tobacco Use    Smoking status: Never    Smokeless tobacco: Never   Vaping Use    Vaping status: Never Used   Substance Use Topics    Alcohol use: Never    Drug use: Never      E-Cigarette/Vaping    E-Cigarette Use Never User       E-Cigarette/Vaping Substances    Nicotine No     THC No     CBD No     Flavoring No     Other No     Unknown No       I have reviewed and agree with the history as documented.     HPI  See mdm  Review of Systems   Constitutional:  Negative for chills and fever.   HENT:  Negative for ear pain and sore throat.    Eyes:  Positive for pain and redness. Negative for visual disturbance.   Respiratory:  Negative for cough and shortness of breath.    Cardiovascular:  Negative for chest pain and palpitations.   Gastrointestinal:  Negative for abdominal pain and vomiting.   Genitourinary:  Negative for dysuria and hematuria.   Musculoskeletal:  Negative for arthralgias and back pain.   Skin:  Negative for color change and  rash.   Neurological:  Negative for seizures and syncope.   All other systems reviewed and are negative.          Objective       ED Triage Vitals [04/04/25 0036]   Temperature Pulse Blood Pressure Respirations SpO2 Patient Position - Orthostatic VS   98.3 °F (36.8 °C) 95 169/74 18 98 % Sitting      Temp Source Heart Rate Source BP Location FiO2 (%) Pain Score    Oral Monitor Right arm -- No Pain      Vitals      Date and Time Temp Pulse SpO2 Resp BP Pain Score FACES Pain Rating User   04/04/25 0036 98.3 °F (36.8 °C) 95 98 % 18 169/74 No Pain -- KD            Physical Exam  Constitutional:       General: He is not in acute distress.     Appearance: He is not ill-appearing.   HENT:      Head: Normocephalic and atraumatic.      Right Ear: External ear normal.      Left Ear: External ear normal.      Nose: Nose normal.   Eyes:      Extraocular Movements: Extraocular movements intact.      Pupils: Pupils are equal, round, and reactive to light.      Comments: R eye visible metallic FB at 3 o'clock position of cornea. Scleral injection. Visual fields normal. Pain improved with tetracaine.    Pulmonary:      Effort: Pulmonary effort is normal. No respiratory distress.   Abdominal:      General: Abdomen is flat. There is no distension.   Skin:     General: Skin is warm and dry.   Neurological:      General: No focal deficit present.      Mental Status: He is alert.         Results Reviewed       None            No orders to display       Procedures    ED Medication and Procedure Management   Prior to Admission Medications   Prescriptions Last Dose Informant Patient Reported? Taking?   amLODIPine (NORVASC) 5 mg tablet 4/3/2025  Yes Yes   Sig: Take 5 mg by mouth daily   aspirin 81 mg chewable tablet 4/3/2025  Yes Yes   Sig: Chew 81 mg daily   lisinopril (ZESTRIL) 20 mg tablet 4/3/2025  Yes Yes   Sig: Take 20 mg by mouth daily   tamsulosin (FLOMAX) 0.4 mg   No No   Sig: Take 1 capsule (0.4 mg total) by mouth daily with dinner    Patient not taking: Reported on 7/6/2024   tamsulosin (Flomax) 0.4 mg 4/3/2025  Yes Yes   Sig: Take 0.4 mg by mouth 2 (two) times a day      Facility-Administered Medications: None     Discharge Medication List as of 4/4/2025  1:09 AM        START taking these medications    Details   erythromycin (ILOTYCIN) ophthalmic ointment Place a 1/2 inch ribbon of ointment into the lower eyelid every 4 hours while awake for 5 days, Normal           CONTINUE these medications which have NOT CHANGED    Details   amLODIPine (NORVASC) 5 mg tablet Take 5 mg by mouth daily, Historical Med      aspirin 81 mg chewable tablet Chew 81 mg daily, Historical Med      lisinopril (ZESTRIL) 20 mg tablet Take 20 mg by mouth daily, Historical Med      !! tamsulosin (Flomax) 0.4 mg Take 0.4 mg by mouth 2 (two) times a day, Historical Med      !! tamsulosin (FLOMAX) 0.4 mg Take 1 capsule (0.4 mg total) by mouth daily with dinner, Starting Mon 5/2/2022, Normal       !! - Potential duplicate medications found. Please discuss with provider.        No discharge procedures on file.  ED SEPSIS DOCUMENTATION   Time reflects when diagnosis was documented in both MDM as applicable and the Disposition within this note       Time User Action Codes Description Comment    4/4/2025  1:06 AM Sky Reynoso [T15.91XA] Foreign body in eye, right, initial encounter     4/4/2025  1:06 AM Sky Reynoso [S00.259A] Metal foreign body in eye region     4/4/2025  1:08 AM Sky Reynoso [S05.00XA] Corneal abrasion                  Sky Reynoso MD  04/04/25 0322